# Patient Record
Sex: FEMALE | Race: WHITE | NOT HISPANIC OR LATINO | Employment: STUDENT | URBAN - METROPOLITAN AREA
[De-identification: names, ages, dates, MRNs, and addresses within clinical notes are randomized per-mention and may not be internally consistent; named-entity substitution may affect disease eponyms.]

---
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
## 2024-01-08 ENCOUNTER — PATIENT MESSAGE (OUTPATIENT)
Dept: PSYCHIATRY | Facility: CLINIC | Age: 19
End: 2024-01-08
Payer: COMMERCIAL

## 2024-01-10 DIAGNOSIS — F98.8 ATTENTION DEFICIT DISORDER (ADD) WITHOUT HYPERACTIVITY: ICD-10-CM

## 2024-01-10 NOTE — TELEPHONE ENCOUNTER
No care due was identified.  [facility] Embedded Care Due Messages. Reference number: [id number].   1/10/2024 10:08:52 AM CST

## 2024-01-11 ENCOUNTER — CLINICAL SUPPORT (OUTPATIENT)
Dept: PSYCHIATRY | Facility: CLINIC | Age: 19
End: 2024-01-11
Payer: COMMERCIAL

## 2024-01-11 DIAGNOSIS — F41.9 ANXIETY: Primary | ICD-10-CM

## 2024-01-11 DIAGNOSIS — F98.8 ATTENTION DEFICIT DISORDER (ADD) WITHOUT HYPERACTIVITY: ICD-10-CM

## 2024-01-11 PROCEDURE — 90837 PSYTX W PT 60 MINUTES: CPT | Mod: S$GLB,,, | Performed by: COUNSELOR

## 2024-01-11 PROCEDURE — 99999 PR PBB SHADOW E&M-EST. PATIENT-LVL I: CPT | Mod: PBBFAC,,, | Performed by: COUNSELOR

## 2024-01-11 RX ORDER — DEXTROAMPHETAMINE SACCHARATE, AMPHETAMINE ASPARTATE, DEXTROAMPHETAMINE SULFATE AND AMPHETAMINE SULFATE 7.5; 7.5; 7.5; 7.5 MG/1; MG/1; MG/1; MG/1
1 TABLET ORAL 2 TIMES DAILY
Qty: 60 TABLET | Refills: 0 | Status: SHIPPED | OUTPATIENT
Start: 2024-01-11 | End: 2024-01-22
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
## 2024-01-16 ENCOUNTER — CLINICAL SUPPORT (OUTPATIENT)
Dept: PSYCHIATRY | Facility: CLINIC | Age: 19
End: 2024-01-16
Payer: COMMERCIAL

## 2024-01-16 DIAGNOSIS — F41.9 ANXIETY: Primary | ICD-10-CM

## 2024-01-16 DIAGNOSIS — F98.8 ATTENTION DEFICIT DISORDER (ADD) WITHOUT HYPERACTIVITY: ICD-10-CM

## 2024-01-16 PROCEDURE — 90837 PSYTX W PT 60 MINUTES: CPT | Mod: 95,,, | Performed by: COUNSELOR
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
## 2024-01-19 ENCOUNTER — PATIENT MESSAGE (OUTPATIENT)
Dept: FAMILY MEDICINE | Facility: CLINIC | Age: 19
End: 2024-01-19
Payer: COMMERCIAL

## 2024-01-19 DIAGNOSIS — F98.8 ATTENTION DEFICIT DISORDER (ADD) WITHOUT HYPERACTIVITY: ICD-10-CM

## 2024-01-22 ENCOUNTER — OFFICE VISIT (OUTPATIENT)
Dept: PSYCHIATRY | Facility: CLINIC | Age: 19
End: 2024-01-22
Payer: COMMERCIAL

## 2024-01-22 VITALS
SYSTOLIC BLOOD PRESSURE: 110 MMHG | BODY MASS INDEX: 18.62 KG/M2 | DIASTOLIC BLOOD PRESSURE: 74 MMHG | HEIGHT: 65 IN | HEART RATE: 76 BPM | WEIGHT: 111.75 LBS

## 2024-01-22 DIAGNOSIS — F41.9 ANXIETY: Primary | ICD-10-CM

## 2024-01-22 DIAGNOSIS — F33.0 MDD (MAJOR DEPRESSIVE DISORDER), RECURRENT EPISODE, MILD: ICD-10-CM

## 2024-01-22 DIAGNOSIS — F41.1 GAD (GENERALIZED ANXIETY DISORDER): ICD-10-CM

## 2024-01-22 PROCEDURE — 1160F RVW MEDS BY RX/DR IN RCRD: CPT | Mod: CPTII,S$GLB,,

## 2024-01-22 PROCEDURE — 3078F DIAST BP <80 MM HG: CPT | Mod: CPTII,S$GLB,,

## 2024-01-22 PROCEDURE — 1159F MED LIST DOCD IN RCRD: CPT | Mod: CPTII,S$GLB,,

## 2024-01-22 PROCEDURE — 3074F SYST BP LT 130 MM HG: CPT | Mod: CPTII,S$GLB,,

## 2024-01-22 PROCEDURE — 99999 PR PBB SHADOW E&M-EST. PATIENT-LVL IV: CPT | Mod: PBBFAC,,,

## 2024-01-22 PROCEDURE — 90792 PSYCH DIAG EVAL W/MED SRVCS: CPT | Mod: S$GLB,,,

## 2024-01-22 RX ORDER — PAROXETINE 10 MG/1
TABLET, FILM COATED ORAL
Qty: 30 TABLET | Refills: 0 | Status: SHIPPED | OUTPATIENT
Start: 2024-01-22 | End: 2024-02-14

## 2024-01-22 RX ORDER — LEVOCETIRIZINE DIHYDROCHLORIDE 5 MG/1
5 TABLET, FILM COATED ORAL NIGHTLY
COMMUNITY

## 2024-01-22 RX ORDER — HYDROXYZINE HYDROCHLORIDE 50 MG/1
50 TABLET, FILM COATED ORAL 3 TIMES DAILY PRN
Qty: 90 TABLET | Refills: 1 | Status: SHIPPED | OUTPATIENT
Start: 2024-01-22 | End: 2024-04-16
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
## 2024-01-23 RX ORDER — DEXTROAMPHETAMINE SACCHARATE, AMPHETAMINE ASPARTATE, DEXTROAMPHETAMINE SULFATE AND AMPHETAMINE SULFATE 7.5; 7.5; 7.5; 7.5 MG/1; MG/1; MG/1; MG/1
1 TABLET ORAL 2 TIMES DAILY
Qty: 60 TABLET | Refills: 0 | Status: SHIPPED | OUTPATIENT
Start: 2024-01-23 | End: 2024-02-26

## 2024-01-23 RX ORDER — DEXTROAMPHETAMINE SACCHARATE, AMPHETAMINE ASPARTATE, DEXTROAMPHETAMINE SULFATE AND AMPHETAMINE SULFATE 7.5; 7.5; 7.5; 7.5 MG/1; MG/1; MG/1; MG/1
1 TABLET ORAL 2 TIMES DAILY
Qty: 60 TABLET | Refills: 0 | Status: SHIPPED | OUTPATIENT
Start: 2024-01-23 | End: 2024-01-23

## 2024-02-06 ENCOUNTER — PATIENT MESSAGE (OUTPATIENT)
Dept: PSYCHIATRY | Facility: CLINIC | Age: 19
End: 2024-02-06
Payer: COMMERCIAL

## 2024-02-14 DIAGNOSIS — F41.1 GAD (GENERALIZED ANXIETY DISORDER): ICD-10-CM

## 2024-02-14 DIAGNOSIS — F33.0 MDD (MAJOR DEPRESSIVE DISORDER), RECURRENT EPISODE, MILD: ICD-10-CM

## 2024-02-14 RX ORDER — PAROXETINE 10 MG/1
10 TABLET, FILM COATED ORAL NIGHTLY
Qty: 30 TABLET | Refills: 0 | Status: SHIPPED | OUTPATIENT
Start: 2024-02-14 | End: 2024-03-12

## 2024-02-16 ENCOUNTER — OFFICE VISIT (OUTPATIENT)
Dept: URGENT CARE | Facility: CLINIC | Age: 19
End: 2024-02-16
Payer: COMMERCIAL

## 2024-02-16 VITALS
HEIGHT: 65 IN | HEART RATE: 68 BPM | BODY MASS INDEX: 18.49 KG/M2 | RESPIRATION RATE: 16 BRPM | SYSTOLIC BLOOD PRESSURE: 115 MMHG | TEMPERATURE: 98 F | WEIGHT: 111 LBS | OXYGEN SATURATION: 98 % | DIASTOLIC BLOOD PRESSURE: 75 MMHG

## 2024-02-16 DIAGNOSIS — T14.8XXA SKIN ABRASION: ICD-10-CM

## 2024-02-16 DIAGNOSIS — J06.9 VIRAL URI WITH COUGH: Primary | ICD-10-CM

## 2024-02-16 DIAGNOSIS — R09.81 SINUS CONGESTION: ICD-10-CM

## 2024-02-16 LAB
CTP QC/QA: YES
CTP QC/QA: YES
POC MOLECULAR INFLUENZA A AGN: NEGATIVE
POC MOLECULAR INFLUENZA B AGN: NEGATIVE
SARS-COV-2 AG RESP QL IA.RAPID: NEGATIVE

## 2024-02-16 PROCEDURE — 87502 INFLUENZA DNA AMP PROBE: CPT | Mod: QW,S$GLB,, | Performed by: NURSE PRACTITIONER

## 2024-02-16 PROCEDURE — 87811 SARS-COV-2 COVID19 W/OPTIC: CPT | Mod: QW,S$GLB,, | Performed by: NURSE PRACTITIONER

## 2024-02-16 PROCEDURE — 99213 OFFICE O/P EST LOW 20 MIN: CPT | Mod: S$GLB,,, | Performed by: NURSE PRACTITIONER

## 2024-02-16 RX ORDER — IPRATROPIUM BROMIDE 21 UG/1
1 SPRAY, METERED NASAL 2 TIMES DAILY
Qty: 30 ML | Refills: 0 | Status: SHIPPED | OUTPATIENT
Start: 2024-02-16 | End: 2024-02-23

## 2024-02-16 RX ORDER — MUPIROCIN 20 MG/G
OINTMENT TOPICAL
Qty: 22 G | Refills: 0 | Status: SHIPPED | OUTPATIENT
Start: 2024-02-16 | End: 2024-05-03

## 2024-02-16 NOTE — PATIENT INSTRUCTIONS
You may continue to use Mucinex or other over-the-counter products for your symptoms as needed.    Please dress your wound with the prescribed antibiotic ointment and bandage for the next 2-3 days.  After that time the lesions should begin to scab and it would be best to leave it open to air.  If there are any changes to the lesion, including fever, spreading redness or pus, please return to this location for re-evaluation.

## 2024-02-16 NOTE — PROGRESS NOTES
"Subjective:      Patient ID: Tamanna Waters is a 18 y.o. female.    Vitals:  height is 5' 5" (1.651 m) and weight is 50.3 kg (111 lb). Her tympanic temperature is 97.5 °F (36.4 °C). Her blood pressure is 115/75 and her pulse is 68. Her respiration is 16 and oxygen saturation is 98%.     Chief Complaint: Sinus Problem and Laceration    Patient presents to clinic with complaint of cough, congestion, sore throat, loss of taste, and razor cut to the right leg. Symptoms started a week ago, but the cut happened last night while shaving.     Provider note begins below:    Patient comes to the clinic with a one-week history of sinus congestion, sore throat and cough.  She was recently in [place] but thinks she may have been ill prior to arriving.  Mother is also ill and tested positive for COVID-19.  Given mother's positive COVID-19 diagnosis after her daughter it is likely that the patient also had COVID-19 but is no longer testing positive.    Patient also endorses having shave last night and took off a superficial layer of epidermis.  Appears is 2 cm x 3 cm avulsed abrasion to anterior right lower leg.    Sinus Problem  This is a new problem. The current episode started 1 to 4 weeks ago. The problem is unchanged. There has been no fever. Her pain is at a severity of 2/10. Associated symptoms include congestion, coughing and a sore throat. Pertinent negatives include no chills, diaphoresis, ear pain, headaches, hoarse voice, neck pain, shortness of breath, sinus pressure, sneezing or swollen glands. Past treatments include oral decongestants.   Laceration       Constitution: Negative for chills and sweating.   HENT:  Positive for congestion and sore throat. Negative for ear pain and sinus pressure.    Neck: Negative for neck pain.   Respiratory:  Positive for cough. Negative for shortness of breath.    Skin:  Positive for laceration.   Allergic/Immunologic: Negative for sneezing.   Neurological:  Negative for " headaches.      Objective:     Physical Exam   Constitutional: She is oriented to person, place, and time. She appears well-developed. She is cooperative.  Non-toxic appearance. She does not appear ill. No distress.   HENT:   Head: Normocephalic and atraumatic.   Ears:   Right Ear: Hearing and external ear normal.   Left Ear: Hearing and external ear normal.   Nose: Rhinorrhea present. No mucosal edema or nasal deformity.   Mouth/Throat: Mucous membranes are normal.   Eyes: Conjunctivae and lids are normal. No scleral icterus.   Neck: Trachea normal and phonation normal. Neck supple. No edema present. No erythema present. No neck rigidity present.   Cardiovascular: Normal rate, regular rhythm, normal heart sounds and normal pulses.   Pulmonary/Chest: Effort normal and breath sounds normal. No respiratory distress. She has no decreased breath sounds. She has no rhonchi.   Abdominal: Normal appearance.   Musculoskeletal: Normal range of motion.         General: No deformity. Normal range of motion.   Neurological: She is alert and oriented to person, place, and time. She exhibits normal muscle tone. Coordination normal.   Skin: Skin is warm, dry, intact, not diaphoretic and not pale. Abrasions - lower ext.:  lower leg (right)       Psychiatric: Her speech is normal and behavior is normal. Judgment and thought content normal.   Nursing note and vitals reviewed.      Assessment:     1. Viral URI with cough    2. Sinus congestion    3. Skin abrasion        Plan:   Band-Aid with mupirocin applied to right lower leg abrasion.    I explained the quarantine process per CDC guidelines and patient acknowledged complete understanding and agrees to plan.      Viral URI with cough  -     ipratropium (ATROVENT) 21 mcg (0.03 %) nasal spray; 1 spray by Each Nostril route 2 (two) times daily. for 7 days  Dispense: 30 mL; Refill: 0    Sinus congestion  -     POCT Influenza A/B MOLECULAR  -     SARS Coronavirus 2 Antigen, POCT Manual  Read  -     ipratropium (ATROVENT) 21 mcg (0.03 %) nasal spray; 1 spray by Each Nostril route 2 (two) times daily. for 7 days  Dispense: 30 mL; Refill: 0    Skin abrasion  -     mupirocin (BACTROBAN) 2 % ointment; Apply to affected area 2 times daily  Dispense: 22 g; Refill: 0

## 2024-02-18 ENCOUNTER — PATIENT MESSAGE (OUTPATIENT)
Dept: FAMILY MEDICINE | Facility: CLINIC | Age: 19
End: 2024-02-18
Payer: COMMERCIAL

## 2024-02-20 ENCOUNTER — PATIENT MESSAGE (OUTPATIENT)
Dept: PSYCHIATRY | Facility: CLINIC | Age: 19
End: 2024-02-20
Payer: COMMERCIAL

## 2024-02-26 ENCOUNTER — OFFICE VISIT (OUTPATIENT)
Dept: FAMILY MEDICINE | Facility: CLINIC | Age: 19
End: 2024-02-26
Payer: COMMERCIAL

## 2024-02-26 VITALS
WEIGHT: 111.75 LBS | SYSTOLIC BLOOD PRESSURE: 120 MMHG | BODY MASS INDEX: 18.6 KG/M2 | HEART RATE: 98 BPM | OXYGEN SATURATION: 98 % | DIASTOLIC BLOOD PRESSURE: 70 MMHG

## 2024-02-26 DIAGNOSIS — R04.0 BLEEDING NOSE: Primary | ICD-10-CM

## 2024-02-26 DIAGNOSIS — J30.9 ALLERGIC RHINITIS, UNSPECIFIED SEASONALITY, UNSPECIFIED TRIGGER: ICD-10-CM

## 2024-02-26 PROCEDURE — 3074F SYST BP LT 130 MM HG: CPT | Mod: CPTII,S$GLB,,

## 2024-02-26 PROCEDURE — 99999 PR PBB SHADOW E&M-EST. PATIENT-LVL IV: CPT | Mod: PBBFAC,,,

## 2024-02-26 PROCEDURE — 3008F BODY MASS INDEX DOCD: CPT | Mod: CPTII,S$GLB,,

## 2024-02-26 PROCEDURE — 99213 OFFICE O/P EST LOW 20 MIN: CPT | Mod: S$GLB,,,

## 2024-02-26 PROCEDURE — 1159F MED LIST DOCD IN RCRD: CPT | Mod: CPTII,S$GLB,,

## 2024-02-26 PROCEDURE — 3078F DIAST BP <80 MM HG: CPT | Mod: CPTII,S$GLB,,

## 2024-02-26 RX ORDER — OXYMETAZOLINE HCL 0.05 %
2 SPRAY, NON-AEROSOL (ML) NASAL 2 TIMES DAILY
Qty: 22 ML | Refills: 0 | Status: SHIPPED | OUTPATIENT
Start: 2024-02-26 | End: 2024-02-29

## 2024-02-26 NOTE — PROGRESS NOTES
Name: Tamanna Waters  MRN: [id number]  : 2005  PCP: Richi Little MD    HPI    Patient follow with Dr. Little, presents for frequent nose bleeds. She reports nose bleeds have been an issue for several years. She reports she gets nose bleeds at least once a day most days. Nose bleeds are worst when she was rhinorrhea or congestion. She reports using tissue to pack her nose and wait for bleeding to stop which is typically 20 mins. She reports her father having to cautery done to stop his nose bleeds.     Review of Systems   HENT:  Positive for congestion, nosebleeds and sinus pain. Negative for sore throat.        Patient Active Problem List   Diagnosis    Impetigo    Episodic lightheadedness    Allergic rhinitis    Iron deficiency anemia due to chronic blood loss    Anxiety    Attention deficit disorder (ADD) without hyperactivity    Wears contact lenses    LINO (generalized anxiety disorder)    MDD (major depressive disorder), recurrent episode, mild       Vitals:    24 1333   BP: 120/70   Pulse: 98       Physical Exam  Constitutional:       General: She is not in acute distress.     Appearance: She is well-developed.   HENT:      Head: Normocephalic and atraumatic.      Right Ear: External ear normal.      Left Ear: External ear normal.   Eyes:      Conjunctiva/sclera: Conjunctivae normal.      Pupils: Pupils are equal, round, and reactive to light.   Neck:      Thyroid: No thyromegaly.   Cardiovascular:      Rate and Rhythm: Normal rate and regular rhythm.      Pulses: Normal pulses.      Heart sounds: Normal heart sounds, S1 normal and S2 normal.   Pulmonary:      Effort: Pulmonary effort is normal. No respiratory distress.      Breath sounds: Normal breath sounds.   Chest:      Chest wall: No tenderness.   Musculoskeletal:         General: No swelling or tenderness. Normal range of motion.      Cervical back: Normal range of motion and neck supple.   Skin:     General: Skin is warm and dry.       Coloration: Skin is not jaundiced or pale.   Neurological:      General: No focal deficit present.      Mental Status: She is alert and oriented to person, place, and time.      Cranial Nerves: No cranial nerve deficit.   Psychiatric:         Mood and Affect: Mood normal.         Behavior: Behavior normal.         1. Bleeding nose  -     Ambulatory referral/consult to ENT; Future; Expected date: 03/04/2024  -     oxymetazoline (AFRIN) 0.05 % nasal spray; 2 sprays by Nasal route 2 (two) times daily. for 3 days  Dispense: 22 mL; Refill: 0    2. Allergic rhinitis, unspecified seasonality, unspecified trigger        Follow up as needed or if symptoms fail to improve      TONE Weston  02/26/2024

## 2024-02-27 ENCOUNTER — CLINICAL SUPPORT (OUTPATIENT)
Dept: PSYCHIATRY | Facility: CLINIC | Age: 19
End: 2024-02-27
Payer: COMMERCIAL

## 2024-02-27 ENCOUNTER — PATIENT MESSAGE (OUTPATIENT)
Dept: PSYCHIATRY | Facility: CLINIC | Age: 19
End: 2024-02-27
Payer: COMMERCIAL

## 2024-02-27 DIAGNOSIS — F41.9 ANXIETY: Primary | ICD-10-CM

## 2024-02-27 DIAGNOSIS — F33.0 MDD (MAJOR DEPRESSIVE DISORDER), RECURRENT EPISODE, MILD: ICD-10-CM

## 2024-02-27 PROCEDURE — 90832 PSYTX W PT 30 MINUTES: CPT | Mod: 95,,, | Performed by: COUNSELOR

## 2024-02-27 NOTE — PROGRESS NOTES
Individual Psychotherapy (PhD/LCSW)    2/27/2024    Site:  [place]         The patient location [place]/City is:  [place]  The patient phone number is: [phone]   Visit type: Virtual visit with synchronous audio and video  Each patient to whom he or she provides medical services by telemedicine is:  (1) informed of the relationship between the physician and patient and the respective role of any other health care provider with respect to management of the patient; and (2) notified that he or she may decline to receive medical services by telemedicine and may withdraw from such care at any time.  Crisis Disclaimer: Patient was informed that due to the virtual nature of the visit, that if a crisis develops, protocols will be implemented to ensure patient safety, including but not limited to: 1) Initiating a welfare check with local Law Enforcement, 2) Calling 911/National Crisis Hotline, and/or 3) Initiating PEC/CEC procedures.    Therapeutic Intervention: Met with patient.  Outpatient - Insight oriented psychotherapy 30 min - CPT code 75741, Outpatient - Behavior modifying psychotherapy 30 min - CPT code 15819, and Outpatient - Supportive psychotherapy 30 min - CPT Code 98217    Chief complaint/reason for encounter: depression and anxiety             Interval history and content of current session: Patient presented for virtual session.  Patient denied SI/HI and self-harm.  She reported that sleep and appetite are good.  Patient reported being happy and peaceful now that she is growing closer to her family.  She reportedly has learned that the majority of the family conflict was due to her own anger and resentments.  She has changed jobs and feels good about that.  Her personal relationship is supportive and loving.  Patient stated that she has found it easy to be genuinely who she is without wearing mask and shutting out her emotions.  No medication or medical concerns currently.  She had COVID and a nose  bleed , but went to  her PCP to address. Will schedule as needed.      Treatment plan:  Target symptoms: depression, anxiety   Why chosen therapy is appropriate versus another modality: relevant to diagnosis, patient responds to this modality, evidence based practice  Outcome monitoring methods: self-report, observation  Therapeutic intervention type: insight oriented psychotherapy, behavior modifying psychotherapy, supportive psychotherapy    Risk parameters:  Patient reports no suicidal ideation  Patient reports no homicidal ideation  Patient reports no self-injurious behavior  Patient reports no violent behavior    Verbal deficits: None    Patient's response to intervention:  The patient's response to intervention is accepting.    Progress toward goals and other mental status changes:  The patient's progress toward goals is good.    Diagnosis:     ICD-10-CM ICD-9-CM   1. Anxiety  F41.9 300.00   2. MDD (major depressive disorder), recurrent episode, mild  F33.0 296.31       Plan:  individual psychotherapy Pt to go to ED or call 911 if symptoms worsen or if she has thoughts of harming self and/or others. Pt verbalized understanding.    Return to clinic: as scheduled    Length of Service (minutes): 45      Each patient to whom he or she provides medical services by telemedicine is: (1) informed of the relationship between the physician and patient and the respective role of any other health care provider with respect to management of the patient; and (2) notified that he or she may decline to receive medical services by telemedicine and may withdraw from such care at any time.

## 2024-02-29 ENCOUNTER — OFFICE VISIT (OUTPATIENT)
Dept: URGENT CARE | Facility: CLINIC | Age: 19
End: 2024-02-29
Payer: COMMERCIAL

## 2024-02-29 VITALS
SYSTOLIC BLOOD PRESSURE: 106 MMHG | TEMPERATURE: 98 F | DIASTOLIC BLOOD PRESSURE: 73 MMHG | RESPIRATION RATE: 16 BRPM | OXYGEN SATURATION: 98 % | HEART RATE: 77 BPM

## 2024-02-29 DIAGNOSIS — J06.9 VIRAL URI WITH COUGH: Primary | ICD-10-CM

## 2024-02-29 DIAGNOSIS — R05.9 COUGH, UNSPECIFIED TYPE: ICD-10-CM

## 2024-02-29 PROCEDURE — 87502 INFLUENZA DNA AMP PROBE: CPT | Mod: QW,S$GLB,, | Performed by: NURSE PRACTITIONER

## 2024-02-29 PROCEDURE — 87811 SARS-COV-2 COVID19 W/OPTIC: CPT | Mod: QW,S$GLB,, | Performed by: NURSE PRACTITIONER

## 2024-02-29 PROCEDURE — 99213 OFFICE O/P EST LOW 20 MIN: CPT | Mod: S$GLB,,, | Performed by: NURSE PRACTITIONER

## 2024-02-29 NOTE — PROGRESS NOTES
Subjective:      Patient ID: Tamanna Waters is a 18 y.o. female.    Vitals:  temperature is 98 °F (36.7 °C). Her blood pressure is 106/73 and her pulse is 77. Her respiration is 16 and oxygen saturation is 98%.     Chief Complaint: Cough    Pt presents with cough, fatigue, sinus ness, headache x 3 days    Pt was sick with same symptoms approx 3 weeks ago--neg for anything.  Got well now sick again.  Pt requests flu and covid    Cough  This is a new problem. The current episode started in the past 7 days. The problem has been gradually worsening. The problem occurs every few minutes. The cough is Non-productive. Associated symptoms include headaches, myalgias, nasal congestion and postnasal drip. Pertinent negatives include no chest pain, chills, ear pain, fever, rash or sore throat. Nothing aggravates the symptoms. Treatments tried: nasal spray. The treatment provided mild relief.       Constitution: Negative. Negative for chills, sweating, fatigue and fever.   HENT:  Positive for postnasal drip. Negative for ear pain, facial swelling, congestion and sore throat.    Neck: Negative for painful lymph nodes.   Cardiovascular: Negative.  Negative for chest trauma, chest pain and sob on exertion.   Eyes: Negative.  Negative for eye itching and eye pain.   Respiratory:  Positive for cough. Negative for chest tightness and asthma.    Gastrointestinal: Negative.  Negative for nausea, vomiting and diarrhea.   Endocrine: negative. cold intolerance and excessive thirst.   Genitourinary: Negative.  Negative for dysuria, frequency, urgency and hematuria.   Musculoskeletal:  Positive for muscle ache. Negative for pain, trauma and joint pain.   Skin: Negative.  Negative for rash, wound and hives.   Allergic/Immunologic: Negative.  Negative for eczema, asthma, hives and itching.   Neurological:  Positive for headaches. Negative for disorientation and altered mental status.   Hematologic/Lymphatic: Negative.  Negative for  swollen lymph nodes.   Psychiatric/Behavioral: Negative.  Negative for altered mental status, disorientation and confusion.       Objective:     Physical Exam   Constitutional: She is oriented to person, place, and time. She appears well-developed. She is cooperative.  Non-toxic appearance. She does not appear ill. No distress.   HENT:   Head: Normocephalic and atraumatic.   Ears:   Right Ear: Hearing, tympanic membrane, external ear and ear canal normal. impacted cerumen  Left Ear: Hearing, external ear and ear canal normal. A middle ear effusion is present. impacted cerumen  Nose: Mucosal edema and congestion present. No rhinorrhea or nasal deformity. No epistaxis. Right sinus exhibits no maxillary sinus tenderness and no frontal sinus tenderness. Left sinus exhibits no maxillary sinus tenderness and no frontal sinus tenderness.   Mouth/Throat: Uvula is midline and mucous membranes are normal. No trismus in the jaw. Normal dentition. No uvula swelling. No oropharyngeal exudate, posterior oropharyngeal edema or posterior oropharyngeal erythema.   Eyes: Conjunctivae and lids are normal. No scleral icterus.   Neck: Trachea normal and phonation normal. Neck supple. No edema present. No erythema present. No neck rigidity present.   Cardiovascular: Normal rate, regular rhythm, normal heart sounds and normal pulses.   Pulmonary/Chest: Effort normal and breath sounds normal. No stridor. No respiratory distress. She has no decreased breath sounds. She has no wheezes. She has no rhonchi. She has no rales. She exhibits no tenderness.   Abdominal: Normal appearance. There is no abdominal tenderness.   Musculoskeletal: Normal range of motion.         General: No deformity. Normal range of motion.   Lymphadenopathy:     She has no cervical adenopathy.   Neurological: no focal deficit. She is alert, oriented to person, place, and time and at baseline. She exhibits normal muscle tone. Coordination normal.   Skin: Skin is warm,  dry, intact, not diaphoretic and not pale.   Psychiatric: Her speech is normal and behavior is normal. Mood, judgment and thought content normal.   Nursing note and vitals reviewed.    Results for orders placed or performed in visit on 02/29/24   SARS Coronavirus 2 Antigen, POCT Manual Read   Result Value Ref Range    SARS Coronavirus 2 Antigen Negative Negative     Acceptable Yes    POCT Influenza A/B MOLECULAR   Result Value Ref Range    POC Molecular Influenza A Ag Negative Negative, Not Reported    POC Molecular Influenza B Ag Negative Negative, Not Reported     Acceptable Yes         Assessment:     1. Viral URI with cough    2. Cough, unspecified type        Plan:   FOLLOWUP  Follow up if symptoms worsen or fail to improve, for PLEASE CONTACT PCP OR CONTACT THE EMERGENCY ROOM..     PATIENT INSTRUCTIONS  Patient Instructions   INSTRUCTIONS:  - Rest.  - Drink plenty of fluids.  - Take Tylenol and/or Ibuprofen as directed as needed for fever/pain.  Do not take more than the recommended dose.  - follow up with your PCP within the next 1-2 weeks as needed.  - You must understand that you have received an Urgent Care treatment only and that you may be released before all of your medical problems are known or treated.   - You, the patient, will arrange for follow up care as instructed.   - If your condition worsens or fails to improve we recommend that you receive another evaluation at the ER immediately or contact your PCP to discuss your concerns.   - You can call [phone] or [phone] to help schedule an appointment with the appropriate provider.     -If you smoke cigarettes, it would be beneficial for you to stop.    OVER THE COUNTER RECOMMENDATIONS/SUGGESTIONS.     Make sure to stay well hydrated.     Use Nasal Saline to mechanically move any post nasal drip from your eustachian tube or from the back of your throat.     Use warm salt water gargles to ease your throat  pain. Warm salt water gargles as needed for sore throat-  1/2 tsp salt to 1 cup warm water, gargle as desired.     Use an antihistamine such as Claritin, Zyrtec or Allegra to dry you out.      Use pseudoephedrine (behind the counter) to decongest. Pseudoephedrine  30 mg up to 240 mg /day. It can raise your blood pressure and give you palpitations.     Use mucinex (guaifenisin) to break up mucous up to 2400mg/day to loosen any mucous.   The mucinex DM pill has a cough suppressant that can be sedating. It can be used at night to stop the tickle at the back of your throat.  You can use Mucinex D (it has guaifenesin and a high dose of pseudoephedrine) in the mornings to help decongest.        Use Flonase 1-2 sprays/nostril per day. It is a local acting steroid nasal spray, if you develop a bloody nose, stop using the medication immediately.     Sometimes Nyquil at night is beneficial to help you get some rest, however it is sedating and it does have an antihistamine, and tylenol.     Honey is a natural cough suppressant that can be used.     Tylenol up to 4,000 mg a day is safe for short periods and can be used for body aches, pain, and fever. However in high doses and prolonged use it can cause liver irritation.     Ibuprofen is a non-steroidal anti-inflammatory that can be used for body aches, pain, and fever.However it can also cause stomach irritation if over used.         THANK YOU FOR ALLOWING ME TO PARTICIPATE IN YOUR HEALTHCARE,     Valeriano Galloway NP     Viral URI with cough    Cough, unspecified type  -     SARS Coronavirus 2 Antigen, POCT Manual Read  -     POCT Influenza A/B MOLECULAR

## 2024-02-29 NOTE — PATIENT INSTRUCTIONS
INSTRUCTIONS:  - Rest.  - Drink plenty of fluids.  - Take Tylenol and/or Ibuprofen as directed as needed for fever/pain.  Do not take more than the recommended dose.  - follow up with your PCP within the next 1-2 weeks as needed.  - You must understand that you have received an Urgent Care treatment only and that you may be released before all of your medical problems are known or treated.   - You, the patient, will arrange for follow up care as instructed.   - If your condition worsens or fails to improve we recommend that you receive another evaluation at the ER immediately or contact your PCP to discuss your concerns.   - You can call [phone] or [phone] to help schedule an appointment with the appropriate provider.     -If you smoke cigarettes, it would be beneficial for you to stop.    OVER THE COUNTER RECOMMENDATIONS/SUGGESTIONS.     Make sure to stay well hydrated.     Use Nasal Saline to mechanically move any post nasal drip from your eustachian tube or from the back of your throat.     Use warm salt water gargles to ease your throat pain. Warm salt water gargles as needed for sore throat-  1/2 tsp salt to 1 cup warm water, gargle as desired.     Use an antihistamine such as Claritin, Zyrtec or Allegra to dry you out.      Use pseudoephedrine (behind the counter) to decongest. Pseudoephedrine  30 mg up to 240 mg /day. It can raise your blood pressure and give you palpitations.     Use mucinex (guaifenisin) to break up mucous up to 2400mg/day to loosen any mucous.   The mucinex DM pill has a cough suppressant that can be sedating. It can be used at night to stop the tickle at the back of your throat.  You can use Mucinex D (it has guaifenesin and a high dose of pseudoephedrine) in the mornings to help decongest.        Use Flonase 1-2 sprays/nostril per day. It is a local acting steroid nasal spray, if you develop a bloody nose, stop using the medication immediately.     Sometimes Nyquil at night  is beneficial to help you get some rest, however it is sedating and it does have an antihistamine, and tylenol.     Honey is a natural cough suppressant that can be used.     Tylenol up to 4,000 mg a day is safe for short periods and can be used for body aches, pain, and fever. However in high doses and prolonged use it can cause liver irritation.     Ibuprofen is a non-steroidal anti-inflammatory that can be used for body aches, pain, and fever.However it can also cause stomach irritation if over used.

## 2024-03-04 ENCOUNTER — OFFICE VISIT (OUTPATIENT)
Dept: OTOLARYNGOLOGY | Facility: CLINIC | Age: 19
End: 2024-03-04
Payer: COMMERCIAL

## 2024-03-04 VITALS
DIASTOLIC BLOOD PRESSURE: 73 MMHG | HEART RATE: 96 BPM | BODY MASS INDEX: 19.08 KG/M2 | SYSTOLIC BLOOD PRESSURE: 108 MMHG | WEIGHT: 114.63 LBS

## 2024-03-04 DIAGNOSIS — J34.89 NASAL OBSTRUCTION WITHOUT CHOANAL ATRESIA: ICD-10-CM

## 2024-03-04 DIAGNOSIS — M95.0 INCOMPETENT NASAL VALVE: ICD-10-CM

## 2024-03-04 DIAGNOSIS — J34.2 DEVIATED NASAL SEPTUM: ICD-10-CM

## 2024-03-04 DIAGNOSIS — R04.0 EPISTAXIS: ICD-10-CM

## 2024-03-04 DIAGNOSIS — J30.89 PERENNIAL ALLERGIC RHINITIS: Primary | ICD-10-CM

## 2024-03-04 DIAGNOSIS — J34.3 HYPERTROPHY OF INFERIOR NASAL TURBINATE: ICD-10-CM

## 2024-03-04 PROCEDURE — 99999 PR PBB SHADOW E&M-EST. PATIENT-LVL IV: CPT | Mod: PBBFAC,,, | Performed by: PHYSICIAN ASSISTANT

## 2024-03-04 PROCEDURE — 31231 NASAL ENDOSCOPY DX: CPT | Mod: S$GLB,,, | Performed by: PHYSICIAN ASSISTANT

## 2024-03-04 PROCEDURE — 3008F BODY MASS INDEX DOCD: CPT | Mod: CPTII,S$GLB,, | Performed by: PHYSICIAN ASSISTANT

## 2024-03-04 PROCEDURE — 3078F DIAST BP <80 MM HG: CPT | Mod: CPTII,S$GLB,, | Performed by: PHYSICIAN ASSISTANT

## 2024-03-04 PROCEDURE — 99214 OFFICE O/P EST MOD 30 MIN: CPT | Mod: 25,S$GLB,, | Performed by: PHYSICIAN ASSISTANT

## 2024-03-04 PROCEDURE — 3074F SYST BP LT 130 MM HG: CPT | Mod: CPTII,S$GLB,, | Performed by: PHYSICIAN ASSISTANT

## 2024-03-04 PROCEDURE — 1159F MED LIST DOCD IN RCRD: CPT | Mod: CPTII,S$GLB,, | Performed by: PHYSICIAN ASSISTANT

## 2024-03-04 RX ORDER — TRIAMCINOLONE ACETONIDE 55 UG/1
2 SPRAY, METERED NASAL DAILY
Qty: 16.9 ML | Refills: 11 | Status: SHIPPED | OUTPATIENT
Start: 2024-03-04 | End: 2024-05-20

## 2024-03-04 NOTE — PROGRESS NOTES
Subjective:     Tamanna Waters is a 18 y.o. female who was referred to me by Rafael Blair in consultation for nosebleeds.    Patient reports recurrent episodes of nasal obstruction with associated rhinorrhea, increased mucous production, sneezing, watery/dry eyes and hyposmia.  Patient reports seasonal allergies which usually results in dry nose and epistaxis.  Epistaxis usually from L nostril.      Patient denies a history of asthma, sleep apnea, sinus surgery, or nasal trauma.  Patient reports she has never had any allergy testing.    Past Medical History  She has a past medical history of Iron deficiency anemia due to chronic blood loss and Jaundice.    Past Surgical History  She has no past surgical history on file.    Family History  Her family history includes Depression in her maternal aunt and maternal grandmother; Diabetes in her maternal grandfather, maternal grandmother, maternal uncle, and paternal grandfather; Glaucoma in her maternal grandmother; Heart disease in her maternal grandfather and maternal grandmother; Mental illness in her maternal grandmother; No Known Problems in her brother, father, mother, paternal aunt, paternal grandmother, paternal uncle, and sister; Other in her maternal grandmother.    Social History  She reports that she has never smoked. She has never used smokeless tobacco. She reports current alcohol use. She reports that she does not use drugs.    Allergies  She is allergic to doxycycline, phenergan [promethazine], wellbutrin [bupropion hcl], and zoloft [sertraline].    Medications  She has a current medication list which includes the following prescription(s): hydroxyzine, advil, levocetirizine, loratadine-pseudoephedrine  mg, mupirocin, and paroxetine, and the following Facility-Administered Medications: levonorgestrel.    Review of Systems     Constitutional: Negative for appetite change, chills, fatigue, fever and unexpected weight loss.      HENT: Positive  for facial swelling, nosebleeds, postnasal drip, sinus infection, sinus pressure and trouble swallowing.      Eyes:  Positive for eye drainage, eye itching and photophobia.     Respiratory:  Negative for cough, shortness of breath, sleep apnea, snoring and wheezing.      Cardiovascular:  Negative for chest pain, foot swelling, irregular heartbeat and swollen veins.     Gastrointestinal:  Positive for constipation and diarrhea.     Genitourinary: Negative for difficulty urinating, sexual problems and frequent urination.     Musc: Positive for aching muscles, back pain and neck pain.     Skin: Positive for rash.     Allergy: Positive for seasonal allergies.     Endocrine: Positive for cold intolerance.     Neurological: Positive for dizziness, light-headedness and seizures.     Hematologic: Positive for bruises/bleeds easily.     Psychiatric: Negative for decreased concentration, depression, nervous/anxious and sleep disturbance.               Objective:     /73 (BP Location: Left arm, Patient Position: Sitting, BP Method: Small (Automatic))   Pulse 96   Wt 52 kg (114 lb 10.2 oz)   LMP 02/19/2024 (Approximate)   BMI 19.08 kg/m²      Constitutional:   She appears well-developed and well-nourished. Normal speech.      Head:  Normocephalic and atraumatic. Facial strength is normal.      Ears:    Right Ear: No drainage or swelling. Tympanic membrane is not perforated and not erythematous. No middle ear effusion.   Left Ear: No drainage or swelling. Tympanic membrane is not perforated and not erythematous.  No middle ear effusion.     Nose:  Septal deviation present. No mucosal edema, rhinorrhea or polyps. Nasal septal hematoma: left. No foreign bodies. Turbinate hypertrophy.  Turbinates normal and no turbinate masses.  Right sinus exhibits maxillary sinus tenderness. Right sinus exhibits no frontal sinus tenderness. Left sinus exhibits maxillary sinus tenderness. Left sinus exhibits no frontal sinus tenderness.  "    Mouth/Throat  Oropharynx clear and moist without lesions or asymmetry, normal uvula midline and lips, teeth, and gums normal. No trismus or mucous membrane lesions. No oropharyngeal exudate, posterior oropharyngeal edema or posterior oropharyngeal erythema. Tonsils present.      Neck:  Phonation normal. No thyroid mass and no thyromegaly present.     She has no cervical adenopathy.     Pulmonary/Chest:   Effort normal.     Psychiatric:   She has a normal mood and affect. Her speech is normal and behavior is normal.       Procedure    Nasal endoscopy performed.  See procedure note.     R NV     R MT     L NV with DNS     L MT      Data Reviewed    Hemoglobin (g/dL)   Date Value   08/18/2023 12.2     Hematocrit (%)   Date Value   08/18/2023 37.0     Platelets (K/uL)   Date Value   08/18/2023 223     No results found for: "LABPROT"  No results found for: "APTT"  No results found for: "INR"    Assessment and Plan:     1. Perennial allergic rhinitis  2. Nasal obstruction without choanal atresia  3. Incompetent nasal valve  4. Deviated nasal septum  5. Hypertrophy of inferior nasal turbinate   - Suspect combo of above contributing to symptoms   - checking inhalant immunocaps   - Prescribed patient to START fluticasone propionate and also educated patient on how to properly use nasal sprays  6. Epistaxis  -     discussed intranasal hydration to treat epistaxis  - No evidence of recent bleeding but did sneeze during scope and mild L septal bleeding started but controlled    She will Follow up in about 3 weeks (around 3/25/2024) for test results, re-evaluate post treatment.   I had a discussion with the patient regarding her condition and the further workup and management options.    All questions were answered, and the patient is in agreement with the above.   "

## 2024-03-04 NOTE — PROCEDURES
"Nasal/sinus endoscopy    Date/Time: 3/4/2024 3:00 PM    Time out: Immediately prior to procedure a "time out" was called to verify the correct patient, procedure, equipment, support staff and site/side marked as required.    Performed by: Tres Lyman PA-C  Authorized by: Tres Lyman PA-C    Consent Done?:  Yes (Verbal)  Anesthesia:     Local anesthetic:  Phenylephrine spray    Location: Bilateral.    Endoscope type: Rigid.    Patient tolerance:  Patient tolerated the procedure well with no immediate complications  Nose:     Procedure Performed:  Nasal Endoscopy  External:      No external nasal deformity  Intranasal:      Mucosa no polyps     Mucosa ulcers not present     No mucosa lesions present     Enlarged turbinates     Septum gross deformity (leftward)  Nasopharynx:      No mucosa lesions     Adenoids present     Posterior choanae patent     Eustachian tube patent     Clear mucous from posterior ITs bilaterally without evidence of mucpurulent discharge or signfican tdrainage    "

## 2024-03-04 NOTE — PATIENT INSTRUCTIONS
Nasal Saline Spray ([place], [place], [employer]) - prior to nasal sprays  I want you to use a nasal saline spray (available over the counter) before applying your nasal spray medications.  This spray can help wash away mucus and crusting and allow for better absorption of the medications.  Please use the nasal saline spray about 15 minutes before applying your medicated nasal sprays.     Azelastine (Astepro, Astelin) if allergy testing positive  This is a nasal spray that primarily treats nasal drainage/runny nose (rhinorrhea) and nasal itching.    This works fairly quickly after onset and can be used as needed (does not have to be used as a scheduled medication).  Use as directed, up to 2 times daily.    Nasal Steroid Spray (Flonase, Nasaocort, Rhinocort, Sensimyst) - 2 sprays each nostril once daily  Generic names for over the counter sprays: fluticasone propionate, triamcinolone acetonide, budesonide  You have been prescribed or instructed to take a nasal steroid spray (Flonase). Some symptoms will experience relief within a few days; however, it may take 2-3 weeks to begin to see improvement. This medication needs to be taken consistently to see results.    Use as directed and please be aware the Flonase takes 7-10 days of consistent use before becoming effective- so try to be patient :)    Helpful hints for maximizing nasal spray medication into the nose  - Use the opposite hand to spray the nostril (example: right hand for left nostril). This will help avoid spraying the medication onto the septum (the area that divides the left and right nasal cavity.)  - Tilt the bottle so that it is facing at a slight angle up or straight back, but avoid pointing the bottle straight up while spraying.   - Gently sniff (do not snort) a few seconds after spraying.

## 2024-03-07 ENCOUNTER — PATIENT MESSAGE (OUTPATIENT)
Dept: PSYCHIATRY | Facility: CLINIC | Age: 19
End: 2024-03-07
Payer: COMMERCIAL

## 2024-03-11 ENCOUNTER — LAB VISIT (OUTPATIENT)
Dept: LAB | Facility: HOSPITAL | Age: 19
End: 2024-03-11
Payer: COMMERCIAL

## 2024-03-11 DIAGNOSIS — J30.89 PERENNIAL ALLERGIC RHINITIS: ICD-10-CM

## 2024-03-11 LAB — IGE SERPL-ACNC: <35 IU/ML (ref 0–100)

## 2024-03-11 PROCEDURE — 82785 ASSAY OF IGE: CPT | Performed by: PHYSICIAN ASSISTANT

## 2024-03-11 PROCEDURE — 36415 COLL VENOUS BLD VENIPUNCTURE: CPT | Mod: PO | Performed by: PHYSICIAN ASSISTANT

## 2024-03-11 PROCEDURE — 86003 ALLG SPEC IGE CRUDE XTRC EA: CPT | Performed by: PHYSICIAN ASSISTANT

## 2024-03-11 PROCEDURE — 86003 ALLG SPEC IGE CRUDE XTRC EA: CPT | Mod: 59 | Performed by: PHYSICIAN ASSISTANT

## 2024-03-12 DIAGNOSIS — F33.0 MDD (MAJOR DEPRESSIVE DISORDER), RECURRENT EPISODE, MILD: ICD-10-CM

## 2024-03-12 DIAGNOSIS — F41.1 GAD (GENERALIZED ANXIETY DISORDER): ICD-10-CM

## 2024-03-12 RX ORDER — PAROXETINE 10 MG/1
10 TABLET, FILM COATED ORAL NIGHTLY
Qty: 30 TABLET | Refills: 0 | Status: SHIPPED | OUTPATIENT
Start: 2024-03-12 | End: 2024-03-14 | Stop reason: DRUGHIGH
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
## 2024-03-13 ENCOUNTER — CLINICAL SUPPORT (OUTPATIENT)
Dept: PSYCHIATRY | Facility: CLINIC | Age: 19
End: 2024-03-13
Payer: COMMERCIAL

## 2024-03-13 DIAGNOSIS — F41.1 GAD (GENERALIZED ANXIETY DISORDER): ICD-10-CM

## 2024-03-13 DIAGNOSIS — F33.0 MDD (MAJOR DEPRESSIVE DISORDER), RECURRENT EPISODE, MILD: Primary | ICD-10-CM

## 2024-03-13 DIAGNOSIS — F98.8 ATTENTION DEFICIT DISORDER (ADD) WITHOUT HYPERACTIVITY: ICD-10-CM

## 2024-03-13 PROCEDURE — 90837 PSYTX W PT 60 MINUTES: CPT | Mod: 95,,, | Performed by: COUNSELOR
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
## 2024-03-14 ENCOUNTER — OFFICE VISIT (OUTPATIENT)
Dept: PSYCHIATRY | Facility: CLINIC | Age: 19
End: 2024-03-14
Payer: COMMERCIAL

## 2024-03-14 DIAGNOSIS — F33.0 MDD (MAJOR DEPRESSIVE DISORDER), RECURRENT EPISODE, MILD: ICD-10-CM

## 2024-03-14 DIAGNOSIS — F41.1 GAD (GENERALIZED ANXIETY DISORDER): Primary | ICD-10-CM

## 2024-03-14 LAB
A ALTERNATA IGE QN: <0.1 KU/L
A FUMIGATUS IGE QN: <0.1 KU/L
BERMUDA GRASS IGE QN: <0.1 KU/L
CAT DANDER IGE QN: <0.1 KU/L
CEDAR IGE QN: <0.1 KU/L
D FARINAE IGE QN: <0.1 KU/L
D PTERONYSS IGE QN: <0.1 KU/L
DEPRECATED A ALTERNATA IGE RAST QL: NORMAL
DEPRECATED A FUMIGATUS IGE RAST QL: NORMAL
DEPRECATED BERMUDA GRASS IGE RAST QL: NORMAL
DEPRECATED CAT DANDER IGE RAST QL: NORMAL
DEPRECATED CEDAR IGE RAST QL: NORMAL
DEPRECATED D FARINAE IGE RAST QL: NORMAL
DEPRECATED D PTERONYSS IGE RAST QL: NORMAL
DEPRECATED DOG DANDER IGE RAST QL: NORMAL
DEPRECATED ELDER IGE RAST QL: NORMAL
DEPRECATED ENGL PLANTAIN IGE RAST QL: NORMAL
DEPRECATED PECAN/HICK TREE IGE RAST QL: NORMAL
DEPRECATED ROACH IGE RAST QL: NORMAL
DEPRECATED TIMOTHY IGE RAST QL: NORMAL
DEPRECATED WEST RAGWEED IGE RAST QL: NORMAL
DEPRECATED WHITE OAK IGE RAST QL: NORMAL
DOG DANDER IGE QN: <0.1 KU/L
ELDER IGE QN: <0.1 KU/L
ENGL PLANTAIN IGE QN: <0.1 KU/L
PECAN/HICK TREE IGE QN: <0.1 KU/L
ROACH IGE QN: <0.1 KU/L
TIMOTHY IGE QN: <0.1 KU/L
WEST RAGWEED IGE QN: <0.1 KU/L
WHITE OAK IGE QN: <0.1 KU/L

## 2024-03-14 PROCEDURE — 1159F MED LIST DOCD IN RCRD: CPT | Mod: CPTII,95,,

## 2024-03-14 PROCEDURE — 1160F RVW MEDS BY RX/DR IN RCRD: CPT | Mod: CPTII,95,,

## 2024-03-14 PROCEDURE — 99214 OFFICE O/P EST MOD 30 MIN: CPT | Mod: 95,,,

## 2024-03-14 RX ORDER — PAROXETINE HYDROCHLORIDE 20 MG/1
20 TABLET, FILM COATED ORAL NIGHTLY
Qty: 30 TABLET | Refills: 1 | Status: SHIPPED | OUTPATIENT
Start: 2024-03-14 | End: 2024-04-16 | Stop reason: DRUGHIGH

## 2024-03-27 ENCOUNTER — TELEPHONE (OUTPATIENT)
Dept: OTOLARYNGOLOGY | Facility: CLINIC | Age: 19
End: 2024-03-27
Payer: COMMERCIAL

## 2024-03-27 ENCOUNTER — OFFICE VISIT (OUTPATIENT)
Dept: OTOLARYNGOLOGY | Facility: CLINIC | Age: 19
End: 2024-03-27
Payer: COMMERCIAL

## 2024-03-27 DIAGNOSIS — M95.0 INCOMPETENT NASAL VALVE: ICD-10-CM

## 2024-03-27 DIAGNOSIS — H10.13 ALLERGIC CONJUNCTIVITIS OF BOTH EYES: ICD-10-CM

## 2024-03-27 DIAGNOSIS — J31.0 NON-ALLERGIC RHINITIS: Primary | ICD-10-CM

## 2024-03-27 PROCEDURE — 99213 OFFICE O/P EST LOW 20 MIN: CPT | Mod: 95,,, | Performed by: PHYSICIAN ASSISTANT

## 2024-03-27 RX ORDER — AZELASTINE HYDROCHLORIDE 0.5 MG/ML
1 SOLUTION/ DROPS OPHTHALMIC 2 TIMES DAILY
Qty: 6 ML | Refills: 3 | Status: SHIPPED | OUTPATIENT
Start: 2024-03-27 | End: 2024-05-20

## 2024-03-27 NOTE — TELEPHONE ENCOUNTER
----- Message from Graciela Brown sent at 3/27/2024  9:38 AM CDT -----  Regarding: [facility]ST VIRTUAL APPOINTMENT  Contact: [phone]  Calling in regards to rescheduling appointment to virtual for TODAY as soon as possible. Please call and schedule.

## 2024-03-27 NOTE — PROGRESS NOTES
Otolaryngology Telemedicine Note    Tamanna Waters  Encounter Date: 3/27/2024   YOB: 2005  Referring Physician: No referring provider defined for this encounter.   PCP: Richi Little MD    This is a telehealth visit that was performed at [facility]. Verbal consent to participate in video visit was obtained.     Each patient to whom he or she provides medical services by telemedicine is:  (1) informed of the relationship between the provider and patient and the respective role of any other health care provider with respect to management of the patient; and (2) notified that he or she may decline to receive medical services by telemedicine and may withdraw from such care at any time.  I discussed with the patient that:  - I would evaluate the patient and recommend diagnostics and treatments based on my assessment  - Our sessions are not being recorded and that personal health information is protected  - Our team would provide follow up care in person if/when the patient needs it    The patient location is: [facility] in [facility].   The chief complaint leading to consultation is: rhinitis  Visit type: Virtual visit with synchronous audio and video  Total time spent with patient: 15    HPI: Tamanna is a 18 y.o. female who comes for follow-up of rhinitis.  Her last visit with me was on 3/4/2024.  ELVIN negative.    Patient reports overall improvement in symptoms.  Patient has been using Afrin as needed.  Patient has not started Flonase.  Patient does report a recent exposure to a friend's dog which caused her to have sweating with associated watery/itchy eyes rhinorrhea and fatigue.  Patient also states that taking Benadryl will help with her reaction.  Patient tries using an antihistamine pill daily without significant relief to allergy symptoms.  Patient currently denies any nasal obstruction or hyposmia.    Per last office visit 3/4/2024 :  Patient reports recurrent episodes of nasal obstruction  with associated rhinorrhea, increased mucous production, sneezing, watery/dry eyes and hyposmia.  Patient reports seasonal allergies which usually results in dry nose and epistaxis.  Epistaxis usually from L nostril.       Patient denies a history of asthma, sleep apnea, sinus surgery, or nasal trauma.  Patient reports she has never had any allergy testing.    1. Perennial allergic rhinitis  2. Nasal obstruction without choanal atresia  3. Incompetent nasal valve  4. Deviated nasal septum  5. Hypertrophy of inferior nasal turbinate              - Suspect combo of above contributing to symptoms              - checking inhalant immunocaps              - Prescribed patient to START fluticasone propionate and also educated patient on how to properly use nasal sprays  6. Epistaxis  -     discussed intranasal hydration to treat epistaxis  -     No evidence of recent bleeding but did sneeze during scope and mild L septal bleeding started but controlled      The patient's medications, allergies, past medical, surgical, social and family histories were reviewed and updated as appropriate.    A detailed review of systems was obtained with pertinent positives as per the above HPI, and otherwise negative.    Physical Exam:    Constitutional  General Appearance: well nourished, well-developed, alert, oriented, in no acute distress  Communication: ability, understanding, voice quality normal  Head and Face  Inspection: normocephalic, atraumatic, no scars, lesions or masses    Eyes  Ocular Motility / Alignment: normal alignment, motility, no proptosis  Conjunctiva: not injected  Eyelids: no entropion or ectropion, no edema    Nose  External Nose: no lesions, trauma or deformity  Oral Cavity / Oropharynx  Lips: upper and lower lips pink and moist  Tongue: normal mobility, no obvious lesions    Neck  Inspection: no erythema, no obvious large masses visible  Chest / Respiratory  Chest: no stridor or retractions, normal effort and  expansion  Neurological  Cranial Nerves: grossly intact  General: alert and oriented  Psychiatric  Mood and Affect: no depression, anxiety or agitation    I personally reviewed the following pertinent data at today's visit:    WBC (K/uL)   Date Value   08/18/2023 7.58     Eosinophil % (%)   Date Value   08/18/2023 1.5     Eos # (K/uL)   Date Value   08/18/2023 0.1     Platelets (K/uL)   Date Value   08/18/2023 223     Glucose (mg/dL)   Date Value   08/18/2023 85     Total IgE (IU/mL)   Date Value   03/11/2024 <35       Assessment & Plan:     1. Non-allergic rhinitis  2. Incompetent nasal valve   - STOP afrin and start flonase as discussed at last visit.    - suspect some sensitivities to certain odors or airborne irritants rather than AR.  Discussed this with patient.     3. Allergic conjunctivitis of both eyes  -     azelastine (OPTIVAR) 0.05 % ophthalmic solution; Place 1 drop into both eyes 2 (two) times daily.  Dispense: 6 mL; Refill: 3    She will follow up as needed  I had a discussion with the patient regarding her condition and the further workup and management options.    All questions were answered, and the patient is in agreement with the above.     Disclaimer:  This note may have been prepared utilizing voice recognition software which may result in occasional typographical errors in the text such as sound alike words.   If further clarification is needed, please contact the ENT department of [facility].

## 2024-04-12 ENCOUNTER — PATIENT MESSAGE (OUTPATIENT)
Dept: PSYCHIATRY | Facility: CLINIC | Age: 19
End: 2024-04-12
Payer: COMMERCIAL

## 2024-04-16 ENCOUNTER — OFFICE VISIT (OUTPATIENT)
Dept: PSYCHIATRY | Facility: CLINIC | Age: 19
End: 2024-04-16
Payer: COMMERCIAL

## 2024-04-16 ENCOUNTER — TELEPHONE (OUTPATIENT)
Dept: PSYCHIATRY | Facility: CLINIC | Age: 19
End: 2024-04-16
Payer: COMMERCIAL

## 2024-04-16 DIAGNOSIS — F33.0 MDD (MAJOR DEPRESSIVE DISORDER), RECURRENT EPISODE, MILD: ICD-10-CM

## 2024-04-16 DIAGNOSIS — F41.1 GAD (GENERALIZED ANXIETY DISORDER): Primary | ICD-10-CM

## 2024-04-16 PROCEDURE — 99214 OFFICE O/P EST MOD 30 MIN: CPT | Mod: 95,,,

## 2024-04-16 PROCEDURE — 1160F RVW MEDS BY RX/DR IN RCRD: CPT | Mod: CPTII,95,,

## 2024-04-16 PROCEDURE — 1159F MED LIST DOCD IN RCRD: CPT | Mod: CPTII,95,,

## 2024-04-16 RX ORDER — PAROXETINE HYDROCHLORIDE 40 MG/1
40 TABLET, FILM COATED ORAL EVERY MORNING
Qty: 30 TABLET | Refills: 1 | Status: SHIPPED | OUTPATIENT
Start: 2024-04-16 | End: 2024-04-16

## 2024-04-16 RX ORDER — PROPRANOLOL HYDROCHLORIDE 10 MG/1
10 TABLET ORAL 3 TIMES DAILY PRN
Qty: 90 TABLET | Refills: 1 | Status: SHIPPED | OUTPATIENT
Start: 2024-04-16 | End: 2024-05-20 | Stop reason: DRUGHIGH

## 2024-04-16 RX ORDER — PAROXETINE HYDROCHLORIDE 40 MG/1
40 TABLET, FILM COATED ORAL NIGHTLY
Qty: 30 TABLET | Refills: 1 | Status: SHIPPED | OUTPATIENT
Start: 2024-04-16 | End: 2024-05-20

## 2024-04-16 NOTE — PROGRESS NOTES
Outpatient Psychiatry Follow-Up Visit    Clinical Status of Patient: Outpatient (Ambulatory)  04/16/2024    The patient location is:  [place], [place]  The patient phone number is: [phone]   Visit type: Virtual visit with synchronous audio and video  Each patient to whom he or she provides medical services by telemedicine is:  (1) informed of the relationship between the physician and patient and the respective role of any other health care provider with respect to management of the patient; and (2) notified that he or she may decline to receive medical services by telemedicine and may withdraw from such care at any time.     Chief Complaint: Pt is a 18 y.o. female who presents today for a follow-up. Met with patient.       Interval History and Content of Current Session:  Interim Events/Subjective Report/Content of Current Session:  follow up appointment.    Pt is a 18 y.o. female with past psychiatric hx of LINO (generalized anxiety disorder), MDD (major depressive disorder), recurrent episode, mild who presents for follow up treatment.     Past Psychiatric hx:   Pt. is a 19 yo F with a past psychiatric hx of Anxiety and depression, ADHD presenting to the clinic for an initial evaluation and treatment. Past medical history outlined below. She is not currently taking any psychotropic medications.      3/14/24: Pt presents to OP Psychiatry for routine follow-up for treatment/medication management of LINO, MDD. Pt reports significant improvement in anxiety and depression, and is relieved that she saw this much progress, but still c/o unresolved symptoms. Discussed increasing Paxil to help resolve these symptoms, pt in agreement. States she took the Atarax only once and it was helpful. Feels that this plan is going good. Pt denies SI/HI/AVH, self-harm, plan, or intent. Pt verbalizes understanding of medication instructions through teach back. No other issues, concerns, or problems, will reassess symptoms and  medications in 30 days or PRN if symptoms worsen.     Past Medical hx:   Past Medical History:   Diagnosis Date    Acne 9 years old    Facial trauma Acne?? Very dry skin, dry eyes    Hearing loss My fault    Iron deficiency anemia due to chronic blood loss 11/01/2022    Jaundice     birth    Mental disorder 17    ADD, im being treated for borderline personality disorder, ocd, adhd, pmdd, etc    Seasonal allergies Born    Seizures Around 9, only arpund sunlight flickering        Interim hx:  Medication changes last visit:     Increase paroxetine (PAXIL) 20 MG tablet - Take 1 tablet (20 mg total) every evening for anxiety and depression.  Discussed potential for GI side effects, sexual dysfunction, mood destabilization, headaches.  Continue hydrOXYzine (ATARAX) 50 MG tablet - Take 1 tablet (50 mg total) by mouth 3 (three) times daily as needed for Anxiety.     Anxiety: Increased  Depression: Increased (situational to break-up)  Sleep: Same, no issues  Appetite: Same, no issues     Denies suicidal/homicidal ideations.  Denies hopelessness/worthlessness.    Denies auditory/visual hallucinations.      Tobacco:  vape nicotine  Alcohol:  denies  Drug use:  medical marijuana, tapering off  Caffeine:  1-2 can energy      Review of Systems   PSYCHIATRIC: Pertinent items are noted in the narrative.        CONSTITUTIONAL: weight stable        M/S: no pain today         ENT: no allergies noted today        ABD: no n/v/d     Past Medical, Family and Social History: The patient's past medical, family and social history have been reviewed and updated as appropriate within the electronic medical record. See encounter notes.     Medication Compliance: yes      Side effects: tolerates     Risk Parameters:  Patient reports no suicidal ideation  Patient reports no homicidal ideation  Patient reports no self-injurious behavior  Patient reports no violent behavior     Exam (detailed: at least 9 elements; comprehensive: all 15  elements)   Constitutional  Vitals:  Most recent vital signs, dated less than 90 days prior to this appointment, were reviewed.     General:  unremarkable, age appropriate, casual attire, good eye contact, good rapport       Musculoskeletal  Muscle Strength/Tone:  no flaccidity, no tremor    Gait & Station:  normal      Psychiatric                       Speech:  normal tone, normal rate, rhythm, and volume   Mood & Affect:   Euthymic, congruent, appropriate         Thought Process:   Goal directed; Linear    Associations:   intact   Thought Content:   No SI/HI, delusions, or paranoia, no AV/VH   Insight & Judgement:   Good, adequate to circumstances   Orientation:   grossly intact; alert and oriented x 4    Memory:  intact for content of interview    Language:  grossly intact, can repeat    Attention Span  : Grossly intact for content of interview   Fund of Knowledge:   intact and appropriate to age and level of education        Assessment and Diagnosis   Status/Progress: Based on the examination today, the patient's problem(s) is/are under fair control.  New problems have not been presented today. Comorbidities are not currently complicating management of the primary condition.      Impression:  Pt presents to OP Psychiatry for routine follow-up for treatment/medication management of LINO, MDD. Pt tearful and reports her girlfriend just broke up with her. She c/o rage and anger symptoms that worsen near her cycle. Recently had her IUD removed and her hormones have been unmanageable. Pt denies any self harm and states she does feel safe, but feels like an outsider. She states she doesn't have any friends and is lonely. Asked pt if she has looked into support groups and referred her information to possible group therapy at [facility]. Gave patient information on [facility] for support groups and resources. Told patient about 988 hotline if feeling unsafe. Discussed increasing Paxil to 40 mg, and replacing  Atarax with Inderal for anxiety, pt in agreement. Pt able to calm down and went over medication changes. Pt denies SI/HI/AVH, self-harm, plan, or intent. Pt verbalizes understanding of medication instructions through teach back. No other issues, concerns, or problems, will reassess symptoms and medications in 30 days or PRN if symptoms worsen.      Diagnosis:   - LINO (generalized anxiety disorder)  - MDD (major depressive disorder), recurrent episode, mild      Intervention/Counseling/Treatment Plan   Medication Management:      1.  Increase paroxetine (PAXIL) 40 MG tablet - Take 1 tablet (40 mg total) every evening for anxiety and depression.  Discussed potential for GI side effects, sexual dysfunction, mood destabilization, headaches.     2.  Discontinue hydrOXYzine (ATARAX) 50 MG tablet.    3.  Start propranoloL (INDERAL) 10 MG tablet - Take 1 tablet (10 mg total) by mouth 3 (three) times daily as needed (anxiety).     4.  Patient given contact # for psychotherapists at [facility] and also instructed she may check with insurance for list of providers.      5.  Call to report any worsening of symptoms or problems with the medication. Pt instructed to go to ER with thoughts of harming self, others.            Labs: none         Return to clinic:      30 days or PRN if symptoms worsen    -Spent 30min face to face with the pt; >50% time spent in counseling   -Supportive therapy and psychoeducation provided  -R/B/SE's of medications discussed with the pt who expresses understanding and chooses to take medications as prescribed.   -Pt instructed to call clinic, 911 or go to nearest emergency room if sxs worsen or pt is in   crisis. The pt expresses understanding.      Reena Tate NP  Psychiatric-Mental Health Nurse Practitioner  Department of Psychiatry    [facility] [place] [address] [place] [address]  Phone:  [phone]  Fax: [phone]

## 2024-04-17 ENCOUNTER — TELEPHONE (OUTPATIENT)
Dept: PSYCHIATRY | Facility: CLINIC | Age: 19
End: 2024-04-17
Payer: COMMERCIAL

## 2024-04-18 ENCOUNTER — OFFICE VISIT (OUTPATIENT)
Dept: PSYCHIATRY | Facility: CLINIC | Age: 19
End: 2024-04-18
Payer: COMMERCIAL

## 2024-04-18 ENCOUNTER — CLINICAL SUPPORT (OUTPATIENT)
Dept: PSYCHIATRY | Facility: CLINIC | Age: 19
End: 2024-04-18
Payer: COMMERCIAL

## 2024-04-18 DIAGNOSIS — F33.0 MDD (MAJOR DEPRESSIVE DISORDER), RECURRENT EPISODE, MILD: ICD-10-CM

## 2024-04-18 DIAGNOSIS — F98.8 ATTENTION DEFICIT DISORDER (ADD) WITHOUT HYPERACTIVITY: ICD-10-CM

## 2024-04-18 DIAGNOSIS — F41.1 GAD (GENERALIZED ANXIETY DISORDER): Primary | ICD-10-CM

## 2024-04-18 PROCEDURE — 99999 PR PBB SHADOW E&M-EST. PATIENT-LVL I: CPT | Mod: PBBFAC,,, | Performed by: COUNSELOR

## 2024-04-18 PROCEDURE — 90832 PSYTX W PT 30 MINUTES: CPT | Mod: 95,,, | Performed by: PSYCHOLOGIST

## 2024-04-18 PROCEDURE — 90837 PSYTX W PT 60 MINUTES: CPT | Mod: S$GLB,,, | Performed by: COUNSELOR

## 2024-04-18 NOTE — PROGRESS NOTES
"Individual Psychotherapy/IPT Group Screening (PhD)     DATE: 4/18/2024     Site:  [facility]          Therapeutic Intervention: Met with patient.  Outpatient - Supportive psychotherapy 30 min - CPT Code 98716     Chief complaint/reason for encounter: depression, anxiety, and interpersonal        Interval history and content of current session: Pt arrived on time to group therapy screening. Discussed pt's focus and goals in individual therapy. She reported history of fear of abandonment despite minimal attachment trauma in upbringing. She described "intentionally ruining [her] life" twice recently. She reported sabotaging an engagement and later ending her relationship with her girlfriend, so that they both can work on themselves. She also reported interpersonal pattern of allowing others to use her. Pt reported interest in group therapy and stated she feels excited although curious about inclusivity of others in the group, regarding LGBTQ identity. Discussed undersigned's role in maintaining safe and inclusive environment in group. Provided psychoeducation about group process and discussed structure of group. Pt stated she is available at group time for next 7 weekly sessions. Pt agreed to start next Tuesday.     Treatment plan:  Target symptoms: depression, anxiety  Why chosen therapy is appropriate versus another modality: relevant to diagnosis  Outcome monitoring methods: self-report  Therapeutic intervention type: supportive psychotherapy     Risk parameters:  Patient reports no suicidal ideation  Patient reports no homicidal ideation  Patient reports no self-injurious behavior  Patient reports no violent behavior     Verbal deficits: None     Patient's response to intervention:  The patient's response to intervention is accepting.     Progress toward goals and other mental status changes:  The patient's progress toward goals is fair .     Diagnosis:   MDD; LINO     Plan:  group psychotherapy     Return to " clinic: 1 week

## 2024-04-19 NOTE — PROGRESS NOTES
Individual Psychotherapy (PhD/LCSW)    4/18/2024    Site:  [place]         Therapeutic Intervention: Met with patient.  Outpatient - Insight oriented psychotherapy 60 min - CPT code 03838, Outpatient - Behavior modifying psychotherapy 60 min - CPT code 41731, and Outpatient - Supportive psychotherapy 60 min - CPT Code 93456    Chief complaint/reason for encounter: attention deficit, depression, and anxiety             Interval history and content of current session:  Patient presented for in clinic session.  Patient reported that she has been spiraling deeper into depression and has had trouble figuring  out ways to raise her mood.  Patient says that for several weeks now she has been extremely despondent, but she denied SI, HI and self-harm.  Patient just ended a recent relationship and stated that she is trying to process why she is so needy and often pushes people away.  Patient has poor sleep hygiene and poor appetite at this time.  Provider and patient processed some strategies that may help manage her depression that included participating in activities with friends and families and joining a support group.  She plans to start a [facility] IOP.  Provider and patient also listed outside activities like exercise and that patient should also include meditation, progressive muscle relaxation and or yoga.  Patient will start a gratitude journal as well. Patient does not want referral to inpatient mental health at this time.  Patient will address any medication concerns with her prescriber.  She will return as scheduled.     Treatment plan:  Target symptoms: depression, distractability, lack of focus, anxiety   Why chosen therapy is appropriate versus another modality: relevant to diagnosis, patient responds to this modality, evidence based practice  Outcome monitoring methods: self-report, observation  Therapeutic intervention type: insight oriented psychotherapy, behavior modifying psychotherapy, supportive  psychotherapy    Risk parameters:  Patient reports no suicidal ideation  Patient reports no homicidal ideation  Patient reports no self-injurious behavior  Patient reports no violent behavior    Verbal deficits: None    Patient's response to intervention:  The patient's response to intervention is accepting.    Progress toward goals and other mental status changes:  The patient's progress toward goals is poor.    Diagnosis:   No diagnosis found.    Plan:  individual psychotherapy Pt to go to ED or call 911 if symptoms worsen or if she has thoughts of harming self and/or others. Pt verbalized understanding.    Return to clinic: 2 weeks    Length of Service (minutes): 60      Each patient to whom he or she provides medical services by telemedicine is: (1) informed of the relationship between the physician and patient and the respective role of any other health care provider with respect to management of the patient; and (2) notified that he or she may decline to receive medical services by telemedicine and may withdraw from such care at any time.

## 2024-04-23 ENCOUNTER — CLINICAL SUPPORT (OUTPATIENT)
Dept: PSYCHIATRY | Facility: CLINIC | Age: 19
End: 2024-04-23
Payer: COMMERCIAL

## 2024-04-23 DIAGNOSIS — F33.0 MDD (MAJOR DEPRESSIVE DISORDER), RECURRENT EPISODE, MILD: ICD-10-CM

## 2024-04-23 DIAGNOSIS — F41.1 GAD (GENERALIZED ANXIETY DISORDER): Primary | ICD-10-CM

## 2024-04-23 PROCEDURE — 90853 GROUP PSYCHOTHERAPY: CPT | Mod: S$GLB,,, | Performed by: PSYCHOLOGIST

## 2024-04-23 PROCEDURE — 99999 PR PBB SHADOW E&M-EST. PATIENT-LVL I: CPT | Mod: PBBFAC,,, | Performed by: PSYCHOLOGIST

## 2024-04-29 ENCOUNTER — CLINICAL SUPPORT (OUTPATIENT)
Dept: PSYCHIATRY | Facility: CLINIC | Age: 19
End: 2024-04-29
Payer: COMMERCIAL

## 2024-04-29 DIAGNOSIS — F33.0 MDD (MAJOR DEPRESSIVE DISORDER), RECURRENT EPISODE, MILD: ICD-10-CM

## 2024-04-29 DIAGNOSIS — F98.8 ATTENTION DEFICIT DISORDER (ADD) WITHOUT HYPERACTIVITY: ICD-10-CM

## 2024-04-29 DIAGNOSIS — F41.1 GAD (GENERALIZED ANXIETY DISORDER): Primary | ICD-10-CM

## 2024-04-29 PROCEDURE — 99999 PR PBB SHADOW E&M-EST. PATIENT-LVL I: CPT | Mod: PBBFAC,,, | Performed by: COUNSELOR

## 2024-04-29 PROCEDURE — 90837 PSYTX W PT 60 MINUTES: CPT | Mod: S$GLB,,, | Performed by: COUNSELOR

## 2024-04-29 NOTE — PROGRESS NOTES
Individual Psychotherapy (PhD/LCSW)    4/29/2024    Site:  [place]         Therapeutic Intervention: Met with patient.  Outpatient - Insight oriented psychotherapy 60 min - CPT code 26280, Outpatient - Behavior modifying psychotherapy 60 min - CPT code 09190, and Outpatient - Supportive psychotherapy 60 min - CPT Code 89449    Chief complaint/reason for encounter: attention deficit, depression, and anxiety             Interval history and content of current session: Patient presented for in clinic session.  She denied SI, HI and self-harm.  She reported feeling relaxed and more sure of herself.  She is attempting to make new friends.  Patient has concerns about her parents, but has decided to focus on self.  She has not found a job yet.  She is sleeping well and appetite is good.  No medication concerns reported.  Will return as scheduled.     Treatment plan:  Target symptoms: depression, distractability, lack of focus, anxiety   Why chosen therapy is appropriate versus another modality: relevant to diagnosis, patient responds to this modality, evidence based practice  Outcome monitoring methods: self-report, observation  Therapeutic intervention type: insight oriented psychotherapy, behavior modifying psychotherapy, supportive psychotherapy    Risk parameters:  Patient reports no suicidal ideation  Patient reports no homicidal ideation  Patient reports no self-injurious behavior  Patient reports no violent behavior    Verbal deficits: None    Patient's response to intervention:  The patient's response to intervention is accepting.    Progress toward goals and other mental status changes:  The patient's progress toward goals is fair .    Diagnosis:     ICD-10-CM ICD-9-CM   1. LINO (generalized anxiety disorder)  F41.1 300.02   2. MDD (major depressive disorder), recurrent episode, mild  F33.0 296.31   3. Attention deficit disorder (ADD) without hyperactivity  F98.8 314.00       Plan:  individual psychotherapy Pt to  go to ED or call 911 if symptoms worsen or if she has thoughts of harming self and/or others. Pt verbalized understanding.    Return to clinic: 1 week    Length of Service (minutes): 60      Each patient to whom he or she provides medical services by telemedicine is: (1) informed of the relationship between the physician and patient and the respective role of any other health care provider with respect to management of the patient; and (2) notified that he or she may decline to receive medical services by telemedicine and may withdraw from such care at any time.

## 2024-04-30 ENCOUNTER — CLINICAL SUPPORT (OUTPATIENT)
Dept: PSYCHIATRY | Facility: CLINIC | Age: 19
End: 2024-04-30
Payer: COMMERCIAL

## 2024-04-30 ENCOUNTER — PATIENT MESSAGE (OUTPATIENT)
Dept: PSYCHIATRY | Facility: CLINIC | Age: 19
End: 2024-04-30
Payer: COMMERCIAL

## 2024-04-30 DIAGNOSIS — F41.1 GAD (GENERALIZED ANXIETY DISORDER): Primary | ICD-10-CM

## 2024-04-30 PROCEDURE — 90853 GROUP PSYCHOTHERAPY: CPT | Mod: S$GLB,,, | Performed by: PSYCHOLOGIST

## 2024-05-01 NOTE — TELEPHONE ENCOUNTER
Chart Update:  No involvement of care signed by pt for mom, pt contacted and pt does not wish mom to be involved, but did set up a sooner appointment to assess, and requests to dc group therapy

## 2024-05-01 NOTE — PROGRESS NOTES
Group Psychotherapy (PhD)     04/23/2024     Site:  [facility]          Therapeutic Intervention: Met with patient for group therapy     Group members: 7     Chief complaint/reason for encounter: depression, anxiety, and interpersonal       Interval history and content of current session: Pt arrived on time to 4th of 10 group sessions. Current session was pt's first group session. Introduced new group member. Several group members shared their compassionate concern for a group member who shared last session. Helped group member process the impact of this felt compassion. Another group member shared his success with using a communication tool with his wife, and several women in the group identified with some of his wife's harmful behavior. Another group member shared her concern about her daughter-in-law's parenting behavior with 5 y/o foster child. Another group member shared her sadness and hurt about her  and father of her daughter coming out as patrick late in their relationship. She agreed to process further in future sessions. Another group member opened up about her sexual orientation and her experiences of depersonalization. Discussed childhood experiences that led to depersonalization and how the group can help her regain sense of identity through their compassionate curiosity about her beliefs, feelings, and needs.        Treatment plan:  Target symptoms: depression, anxiety  Why chosen therapy is appropriate versus another modality: relevant to diagnosis  Outcome monitoring methods: self-report  Therapeutic intervention type: behavior modifying psychotherapy     Risk parameters:  Patient reports no suicidal ideation  Patient reports no homicidal ideation  Patient reports no self-injurious behavior  Patient reports no violent behavior     Verbal deficits: None     Patient's response to intervention:  The patient's response to intervention is accepting.     Progress toward goals and other  mental status changes:  The patient's progress toward goals is fair .     Diagnosis:   LINO, MDD     Plan:  Continue in group therapy     Return to clinic:: 1 week     Length of Service (minutes): 90

## 2024-05-02 NOTE — PROGRESS NOTES
"     Group Psychotherapy (PhD)     04/30/2024     Site:  [facility]          Therapeutic Intervention: Met with patient for group therapy     Group members: 7     Chief complaint/reason for encounter: depression, anxiety, and interpersonal       Interval history and content of current session: Pt arrived on time to 5th of 10 group sessions. One group member shared how she is in the process of buying a trailer to facilitate her separation from . She shared her daughter's hurtful, unsupportive response to this decision. Another group member shared that she walked in on her stepdaughter's wife and her  "pulling away from each other." Group provided her support in her decision-making regarding leaving her . Discussed her stepson's seemingly antisocial behavior and group's concern for this member's safety. She also shared info about [facility]'s CPS support. Another group member shared her confrontation of her son-in-law, and group provided support.        Treatment plan:  Target symptoms: depression, anxiety  Why chosen therapy is appropriate versus another modality: relevant to diagnosis  Outcome monitoring methods: self-report  Therapeutic intervention type: behavior modifying psychotherapy     Risk parameters:  Patient reports no suicidal ideation  Patient reports no homicidal ideation  Patient reports no self-injurious behavior  Patient reports no violent behavior     Verbal deficits: None     Patient's response to intervention:  The patient's response to intervention is accepting.     Progress toward goals and other mental status changes:  The patient's progress toward goals is fair .     Diagnosis:   LINO, MDD     Plan:  Continue in group therapy     Return to clinic:: 1 week     Length of Service (minutes): 90             "

## 2024-05-03 ENCOUNTER — OFFICE VISIT (OUTPATIENT)
Dept: PSYCHIATRY | Facility: CLINIC | Age: 19
End: 2024-05-03
Payer: COMMERCIAL

## 2024-05-03 DIAGNOSIS — F33.0 MDD (MAJOR DEPRESSIVE DISORDER), RECURRENT EPISODE, MILD: ICD-10-CM

## 2024-05-03 DIAGNOSIS — F41.1 GAD (GENERALIZED ANXIETY DISORDER): Primary | ICD-10-CM

## 2024-05-03 DIAGNOSIS — F51.02 INSOMNIA DUE TO PSYCHOLOGICAL STRESS: ICD-10-CM

## 2024-05-03 PROCEDURE — 99214 OFFICE O/P EST MOD 30 MIN: CPT | Mod: 95,,,

## 2024-05-03 PROCEDURE — 1159F MED LIST DOCD IN RCRD: CPT | Mod: CPTII,95,,

## 2024-05-03 PROCEDURE — 1160F RVW MEDS BY RX/DR IN RCRD: CPT | Mod: CPTII,95,,

## 2024-05-03 RX ORDER — TRAZODONE HYDROCHLORIDE 50 MG/1
50 TABLET ORAL NIGHTLY PRN
Qty: 30 TABLET | Refills: 1 | Status: SHIPPED | OUTPATIENT
Start: 2024-05-03 | End: 2024-05-20

## 2024-05-03 RX ORDER — PAROXETINE 10 MG/1
10 TABLET, FILM COATED ORAL NIGHTLY
Qty: 30 TABLET | Refills: 1 | Status: SHIPPED | OUTPATIENT
Start: 2024-05-03 | End: 2024-05-20 | Stop reason: DRUGHIGH

## 2024-05-03 NOTE — PROGRESS NOTES
Outpatient Psychiatry Follow-Up Visit    Clinical Status of Patient: Outpatient (Ambulatory)  05/03/2024    The patient location is:  [place], [place]  The patient phone number is: [phone]   Visit type: Virtual visit with synchronous audio and video  Each patient to whom he or she provides medical services by telemedicine is:  (1) informed of the relationship between the physician and patient and the respective role of any other health care provider with respect to management of the patient; and (2) notified that he or she may decline to receive medical services by telemedicine and may withdraw from such care at any time.     Chief Complaint: Pt is a 18 y.o. female who presents today for a follow-up. Met with patient.       Interval History and Content of Current Session:  Interim Events/Subjective Report/Content of Current Session:  follow up appointment.    Pt is a 18 y.o. female with past psychiatric hx of LINO (generalized anxiety disorder), MDD (major depressive disorder), recurrent episode, mild who presents for follow up treatment.     Past Psychiatric hx:   Pt. is a 19 yo F with a past psychiatric hx of Anxiety and depression, ADHD presenting to the clinic for an initial evaluation and treatment. Past medical history outlined below. She is not currently taking any psychotropic medications.      4/16/24: Pt presents to OP Psychiatry for routine follow-up for treatment/medication management of LINO, MDD. Pt tearful and reports her girlfriend just broke up with her. She c/o rage and anger symptoms that worsen near her cycle. Recently had her IUD removed and her hormones have been unmanageable. Pt denies any self harm and states she does feel safe, but feels like an outsider. She states she doesn't have any friends and is lonely. Asked pt if she has looked into support groups and referred her information to possible group therapy at [facility]. Gave patient information on [facility] for support  groups and resources. Told patient about 988 hotline if feeling unsafe. Discussed increasing Paxil to 40 mg, and replacing Atarax with Inderal for anxiety, pt in agreement. Pt able to calm down and went over medication changes. Pt denies SI/HI/AVH, self-harm, plan, or intent. Pt verbalizes understanding of medication instructions through teach back. No other issues, concerns, or problems, will reassess symptoms and medications in 30 days or PRN if symptoms worsen.     Past Medical hx:   Past Medical History:   Diagnosis Date    Acne 9 years old    Facial trauma Acne?? Very dry skin, dry eyes    Hearing loss My fault    Iron deficiency anemia due to chronic blood loss 11/01/2022    Jaundice     birth    Mental disorder 17    ADD, im being treated for borderline personality disorder, ocd, adhd, pmdd, etc    Seasonal allergies Born    Seizures Around 9, only arpund sunlight flickering        Interim hx:  Medication changes last visit:     1.  Increase paroxetine (PAXIL) 40 MG tablet - Take 1 tablet (40 mg total) every evening for anxiety and depression.  Discussed potential for GI side effects, sexual dysfunction, mood destabilization, headaches.  2.  Discontinue hydrOXYzine (ATARAX) 50 MG tablet.  3.  Start propranoloL (INDERAL) 10 MG tablet - Take 1 tablet (10 mg total) by mouth 3 (three) times daily as needed (anxiety).    Anxiety: Same, slight improvement  Depression: Same, slight improvement  Sleep: Worsened  Appetite: Same, no issues     Denies suicidal/homicidal ideations.  Denies hopelessness/worthlessness.    Denies auditory/visual hallucinations.      Tobacco:  vape nicotine  Alcohol:  denies  Drug use:  medical marijuana, tapering off  Caffeine:  1-2 can energy      Review of Systems   PSYCHIATRIC: Pertinent items are noted in the narrative.        CONSTITUTIONAL: weight stable        M/S: no pain today         ENT: no allergies noted today        ABD: no n/v/d     Past Medical, Family and Social  History: The patient's past medical, family and social history have been reviewed and updated as appropriate within the electronic medical record. See encounter notes.     Medication Compliance: yes      Side effects: tolerates     Risk Parameters:  Patient reports no suicidal ideation  Patient reports no homicidal ideation  Patient reports no self-injurious behavior  Patient reports no violent behavior     Exam (detailed: at least 9 elements; comprehensive: all 15 elements)   Constitutional  Vitals:  Most recent vital signs, dated less than 90 days prior to this appointment, were reviewed.     General:  unremarkable, age appropriate, casual attire, good eye contact, good rapport       Musculoskeletal  Muscle Strength/Tone:  no flaccidity, no tremor    Gait & Station:  normal      Psychiatric                       Speech:  normal tone, normal rate, rhythm, and volume   Mood & Affect:   Euthymic, congruent, appropriate         Thought Process:   Goal directed; Linear    Associations:   intact   Thought Content:   No SI/HI, delusions, or paranoia, no AV/VH   Insight & Judgement:   Good, adequate to circumstances   Orientation:   grossly intact; alert and oriented x 4    Memory:  intact for content of interview    Language:  grossly intact, can repeat    Attention Span  : Grossly intact for content of interview   Fund of Knowledge:   intact and appropriate to age and level of education        Assessment and Diagnosis   Status/Progress: Based on the examination today, the patient's problem(s) is/are under fair control.  New problems have not been presented today. Comorbidities are not currently complicating management of the primary condition.      Impression:  Pt presents to OP Psychiatry for routine follow-up for treatment/medication management of LINO, MDD. Pt reports slight improvement in anxiety and depression symptoms and has taken the Inderal PRN with effective results. Pt is committed to keeping busy and has been  staying active playing basketball, roller skating, and exercising to distract. Pt reports her sleep has been poor and sometimes she has slept on the floor because she can't any other way. Discussed increasing Paxil to 50 mg and starting Trazodone at bedtime PRN for insomnia. Instructed pt to contact if still not sleeping after 4-5 nights and we will increase dose to 100 mg until next 30 day FU appt. Pt verbalizes understanding of all medication changes and instructions. Pt denies SI/HI/AVH, self-harm, plan, or intent. Pt verbalizes understanding of medication instructions through teach back. No other issues, concerns, or problems, will reassess symptoms and medications in 30 days or PRN if symptoms worsen.      Diagnosis:   - LINO (generalized anxiety disorder)  - MDD (major depressive disorder), recurrent episode, mild  - Insomnia due to psychological stress       Intervention/Counseling/Treatment Plan   Medication Management:      1.  Increase paroxetine (PAXIL) 10 MG + 40 MG tablet - Take 1 tablet each (50 mg total) every evening for anxiety and depression.  Discussed potential for GI side effects, sexual dysfunction, mood destabilization, headaches.     2.  Continue propranoloL (INDERAL) 10 MG tablet - Take 1 tablet (10 mg total) by mouth 3 (three) times daily as needed (anxiety).     3.  Start traZODone (DESYREL) 50 MG tablet - Take 1 tablet (50 mg total) by mouth nightly as needed for Insomnia.      4. Call to report any worsening of symptoms or problems with the medication. Pt instructed to go to ER with thoughts of harming self, others.     5. Patient given contact # for psychotherapists at [facility] and also instructed she may check with insurance for list of providers.          Labs: none         Return to clinic:      30 days or PRN if symptoms worsen    -Spent 30min face to face with the pt; >50% time spent in counseling   -Supportive therapy and psychoeducation provided  -R/B/SE's of medications  discussed with the pt who expresses understanding and chooses to take medications as prescribed.   -Pt instructed to call clinic, 911 or go to nearest emergency room if sxs worsen or pt is in   crisis. The pt expresses understanding.      Reena Tate NP  Psychiatric-Mental Health Nurse Practitioner  Department of Psychiatry    [facility] [place] [address] [place] [address]  Phone:  [phone]  Fax: [phone]

## 2024-05-07 ENCOUNTER — TELEPHONE (OUTPATIENT)
Dept: PSYCHIATRY | Facility: CLINIC | Age: 19
End: 2024-05-07
Payer: COMMERCIAL

## 2024-05-09 ENCOUNTER — CLINICAL SUPPORT (OUTPATIENT)
Dept: PSYCHIATRY | Facility: CLINIC | Age: 19
End: 2024-05-09
Payer: COMMERCIAL

## 2024-05-09 DIAGNOSIS — F98.8 ATTENTION DEFICIT DISORDER (ADD) WITHOUT HYPERACTIVITY: ICD-10-CM

## 2024-05-09 DIAGNOSIS — F41.1 GAD (GENERALIZED ANXIETY DISORDER): Primary | ICD-10-CM

## 2024-05-09 DIAGNOSIS — F33.0 MDD (MAJOR DEPRESSIVE DISORDER), RECURRENT EPISODE, MILD: ICD-10-CM

## 2024-05-09 DIAGNOSIS — F51.02 INSOMNIA DUE TO PSYCHOLOGICAL STRESS: ICD-10-CM

## 2024-05-09 PROCEDURE — 90837 PSYTX W PT 60 MINUTES: CPT | Mod: 95,,, | Performed by: COUNSELOR

## 2024-05-09 NOTE — PROGRESS NOTES
Individual Psychotherapy (PhD/LCSW)    5/9/2024    Site:  [place]         The patient location Parish/City is:  [place]  The patient phone number is [phone]   Visit type: Virtual visit with synchronous audio and video  Each patient to whom he or she provides medical services by telemedicine is:  (1) informed of the relationship between the physician and patient and the respective role of any other health care provider with respect to management of the patient; and (2) notified that he or she may decline to receive medical services by telemedicine and may withdraw from such care at any time.  Crisis Disclaimer: Patient was informed that due to the virtual nature of the visit, that if a crisis develops, protocols will be implemented to ensure patient safety, including but not limited to: 1) Initiating a welfare check with local Law Enforcement, 2) Calling 1/National Crisis Hotline, and/or 3) Initiating PEC/CEC procedures.  Therapeutic Intervention: Met with patient.  Outpatient - Insight oriented psychotherapy 60 min - CPT code 06972, Outpatient - Behavior modifying psychotherapy 60 min - CPT code 83531, and Outpatient - Supportive psychotherapy 60 min - CPT Code 32682    Chief complaint/reason for encounter: attention deficit, depression, anxiety, and sleep             Interval history and content of current session: Reviewed chart.  Patient presented for virtual session.  Patient reported that she was feeling  better about herself.  She stated that she had let go some friends and is focusing on becoming a better person.  She is not working yet.  She sleeping fair, but has little to no appetite.  Patient was extremely distracted trying to masterson her lip.  Redirected to focus on session at hand.  She discussed the ways she was practicing self-compassion.  [place] new ways to add to her practice.  No medication concerns.  Denied SI, HI and self-harm.  Will return as scheduled.   Treatment plan:  Target symptoms:  depression, distractability, lack of focus, anxiety , insomnia  Why chosen therapy is appropriate versus another modality: relevant to diagnosis, patient responds to this modality, evidence based practice  Outcome monitoring methods: self-report, observation  Therapeutic intervention type: insight oriented psychotherapy, behavior modifying psychotherapy, supportive psychotherapy    Risk parameters:  Patient reports no suicidal ideation  Patient reports no homicidal ideation  Patient reports no self-injurious behavior  Patient reports no violent behavior    Verbal deficits: None    Patient's response to intervention:  The patient's response to intervention is accepting.    Progress toward goals and other mental status changes:  The patient's progress toward goals is fair .    Diagnosis:     ICD-10-CM ICD-9-CM   1. LINO (generalized anxiety disorder)  F41.1 300.02   2. MDD (major depressive disorder), recurrent episode, mild  F33.0 296.31   3. Insomnia due to psychological stress  F51.02 307.41   4. Attention deficit disorder (ADD) without hyperactivity  F98.8 314.00       Plan:  individual psychotherapy Pt to go to ED or call 911 if symptoms worsen or if she has thoughts of harming self and/or others. Pt verbalized understanding.    Return to clinic: 1 week    Length of Service (minutes): 60      Each patient to whom he or she provides medical services by telemedicine is: (1) informed of the relationship between the physician and patient and the respective role of any other health care provider with respect to management of the patient; and (2) notified that he or she may decline to receive medical services by telemedicine and may withdraw from such care at any time.

## 2024-05-10 ENCOUNTER — TELEPHONE (OUTPATIENT)
Dept: PSYCHIATRY | Facility: CLINIC | Age: 19
End: 2024-05-10
Payer: COMMERCIAL

## 2024-05-10 NOTE — TELEPHONE ENCOUNTER
Spoke with pt at last visit, which then she stated she does not want mom involved. Msg from mom was passed to Reena.

## 2024-05-10 NOTE — TELEPHONE ENCOUNTER
"                              For documentation purposes.      Patient mom Mrs. Ragland called requesting to speak with Reena Tate. Informed mom without an involvement of care on file we would only be able to receive information.     States, " Tamanna is being very erotic."  Asked Mom to explain erotic behavior. " I really can't explain, it's like a personality disorder." She hasn't said she wanted to hurt herself."    Mrs. Ragland was asking spouse how would he explain her behavior, dad stated very angry " we would say one thing she was say another." Very short angry she's yelling."    Asked mom if patient was home alone, states, " yes"   Asked mom how long with erotic behavior states, " couple weeks."    NO SI/HI    States. " I had to go get blood work done, but heading back to the house now.'' Tamanna won't see this message right?"   "Because if she knows about this, she's not going to trust us."    Informed mom message will go to patients provider oh[facility].    Encouraged mom if patient symptome's worsens she is needing to go to the nearest ER for further evaluation. If patient is not willing to go voluntarily, call 911.  Mom acknowledged understanding.  Meena"

## 2024-05-16 ENCOUNTER — CLINICAL SUPPORT (OUTPATIENT)
Dept: PSYCHIATRY | Facility: CLINIC | Age: 19
End: 2024-05-16
Payer: COMMERCIAL

## 2024-05-16 DIAGNOSIS — F51.02 INSOMNIA DUE TO PSYCHOLOGICAL STRESS: ICD-10-CM

## 2024-05-16 DIAGNOSIS — F98.8 ATTENTION DEFICIT DISORDER (ADD) WITHOUT HYPERACTIVITY: ICD-10-CM

## 2024-05-16 DIAGNOSIS — F41.1 GAD (GENERALIZED ANXIETY DISORDER): Primary | ICD-10-CM

## 2024-05-16 DIAGNOSIS — F33.0 MDD (MAJOR DEPRESSIVE DISORDER), RECURRENT EPISODE, MILD: ICD-10-CM

## 2024-05-16 PROCEDURE — 90837 PSYTX W PT 60 MINUTES: CPT | Mod: S$GLB,,, | Performed by: COUNSELOR

## 2024-05-16 PROCEDURE — 99999 PR PBB SHADOW E&M-EST. PATIENT-LVL I: CPT | Mod: PBBFAC,,, | Performed by: COUNSELOR

## 2024-05-16 NOTE — PROGRESS NOTES
Individual Psychotherapy (PhD/LCSW)    5/16/2024    Site:  [place]         Therapeutic Intervention: Met with patient.  Outpatient - Insight oriented psychotherapy 60 min - CPT code 84768, Outpatient - Behavior modifying psychotherapy 60 min - CPT code 19183, and Outpatient - Supportive psychotherapy 60 min - CPT Code 77444    Chief complaint/reason for encounter: attention deficit, depression, mood swings, anxiety, sleep, and appetite             Interval history and content of current session:  Patient presented for in clinic session.  Patient denied current SI, HI and self-harm.  Patient recently was discharged from [facility] after her mother had her involuntarily admitted.  Patient reported that her mother alleged that patient was a danger to herself and that her mood & behavior  appeared manic.  Patient denied that she felt that way.  However, patient presented with symptoms of paranoia and low self-esteem.  She reported persistent and pervasive distrust of others, assuming malicious intentions and conspiracy against her.  This paranoia has led to social withdrawal and isolation as patient has struggled to form and maintain relationships due to constant suspicion and mistrust.  Additionally, the patient exhibits low self-esteem, frequently expressing negative self talk, self doubt and a sense of inadequacy.  Patient struggles to acknowledge her strengths and accomplishments, is that focuses on perceived flaws  and shortcomings.  This negative self-image has led to difficulties in the certain themselves and setting boundaries, resulting in feelings of powerlessness and helplessness.  Treatment will focus on addressing these underlying issues through cognitive behavioral therapy, building coping strategies to manage paranoia and foster a more positive self image. Goals include improving relationships, increasing self confidence an enhancing overwhelmed mental well-being.  Patient denied any  medication concerns.  Patient stated that she wants better family relationships.  Patient is seeking employment to save money to go to beauty school.  Patient will return as scheduled.    Treatment plan:  Target symptoms: depression, distractability, lack of focus, anxiety , mood swings, insomnia  Why chosen therapy is appropriate versus another modality: relevant to diagnosis, patient responds to this modality, evidence based practice  Outcome monitoring methods: self-report, observation  Therapeutic intervention type: insight oriented psychotherapy, behavior modifying psychotherapy, supportive psychotherapy    Risk parameters:  Patient reports no suicidal ideation  Patient reports no homicidal ideation  Patient reports no self-injurious behavior  Patient reports no violent behavior    Verbal deficits: None    Patient's response to intervention:  The patient's response to intervention is accepting.    Progress toward goals and other mental status changes:  The patient's progress toward goals is limited.    Diagnosis:     ICD-10-CM ICD-9-CM   1. LINO (generalized anxiety disorder)  F41.1 300.02   2. MDD (major depressive disorder), recurrent episode, mild  F33.0 296.31   3. Insomnia due to psychological stress  F51.02 307.41   4. Attention deficit disorder (ADD) without hyperactivity  F98.8 314.00       Plan:  individual psychotherapy Pt to go to ED or call 911 if symptoms worsen or if she has thoughts of harming self and/or others. Pt verbalized understanding.    Return to clinic: as scheduled    Length of Service (minutes): 60      Each patient to whom he or she provides medical services by telemedicine is: (1) informed of the relationship between the physician and patient and the respective role of any other health care provider with respect to management of the patient; and (2) notified that he or she may decline to receive medical services by telemedicine and may withdraw from such care at any time.

## 2024-05-17 NOTE — PROGRESS NOTES
Outpatient Psychiatry Follow-Up Visit    Clinical Status of Patient: Outpatient (Ambulatory)  05/20/2024     Chief Complaint: Pt is a 18 y.o. non-binary who presents today for a follow-up. Met with patient.       Interval History and Content of Current Session:  Interim Events/Subjective Report/Content of Current Session:  follow up appointment.    Pt is a 18 y.o. non-binary with past psychiatric hx of LINO (generalized anxiety disorder), MDD (major depressive disorder), recurrent episode, mild, Insomnia due to psychological stress who presents for follow up treatment post IP Psych PEC at [facility].     Past Psychiatric hx:   Pt. is a 19 yo F with a past psychiatric hx of Anxiety and depression, ADHD presenting to the clinic for an initial evaluation and treatment. Past medical history outlined below. She is not currently taking any psychotropic medications.      5/3/24: Pt presents to OP Psychiatry for routine follow-up for treatment/medication management of LINO, MDD. Pt reports slight improvement in anxiety and depression symptoms and has taken the Inderal PRN with effective results. Pt is committed to keeping busy and has been staying active playing basketball, roller skating, and exercising to distract. Pt reports her sleep has been poor and sometimes she has slept on the floor because she can't any other way. Discussed increasing Paxil to 50 mg and starting Trazodone at bedtime PRN for insomnia. Instructed pt to contact if still not sleeping after 4-5 nights and we will increase dose to 100 mg until next 30 day FU appt. Pt verbalizes understanding of all medication changes and instructions. Pt denies SI/HI/AVH, self-harm, plan, or intent. Pt verbalizes understanding of medication instructions through teach back. No other issues, concerns, or problems, will reassess symptoms and medications in 30 days or PRN if symptoms worsen.     Past Medical hx:   Past Medical History:   Diagnosis Date     Acne 9 years old    Facial trauma Acne?? Very dry skin, dry eyes    Hearing loss My fault    Iron deficiency anemia due to chronic blood loss 11/01/2022    Jaundice     birth    Mental disorder 17    ADD, im being treated for borderline personality disorder, ocd, adhd, pmdd, etc    Seasonal allergies Born    Seizures Around 9, only arpund sunlight flickering        Interim hx:  Medication changes last visit:     1.  Increase paroxetine (PAXIL) 10 MG + 40 MG tablet - Take 1 tablet each (50 mg total) every evening for anxiety and depression.  Discussed potential for GI side effects, sexual dysfunction, mood destabilization, headaches.  2.  Continue propranoloL (INDERAL) 10 MG tablet - Take 1 tablet (10 mg total) by mouth 3 (three) times daily as needed (anxiety).  3.  Start traZODone (DESYREL) 50 MG tablet - Take 1 tablet (50 mg total) by mouth nightly as needed for Insomnia.     Anxiety: Worsened  Depression: Still c/o unresolved  Sleep: restful  Appetite: decreased     Denies suicidal/homicidal ideations.  Denies hopelessness/worthlessness.    Denies auditory/visual hallucinations.      Tobacco:  vape nicotine  Alcohol:  denies  Drug use:  medical marijuana, tapering off  Caffeine:  2-3 coffee      Review of Systems   PSYCHIATRIC: Pertinent items are noted in the narrative.        CONSTITUTIONAL: weight stable        M/S: no pain today         ENT: no allergies noted today        ABD: no n/v/d     Past Medical, Family and Social History: The patient's past medical, family and social history have been reviewed and updated as appropriate within the electronic medical record. See encounter notes.     Medication Compliance: yes      Side effects: tolerates     Risk Parameters:  Patient reports no suicidal ideation  Patient reports no homicidal ideation  Patient reports no self-injurious behavior  Patient reports no violent behavior     Exam (detailed: at least 9 elements; comprehensive: all 15 elements)    Constitutional  Vitals:  Most recent vital signs, dated less than 90 days prior to this appointment, were reviewed. Pulse:  [75]   BP: (105)/(73)     General:  unremarkable, age appropriate, casual attire, good eye contact, good rapport       Musculoskeletal  Muscle Strength/Tone:  no flaccidity, no tremor    Gait & Station:  normal      Psychiatric                       Speech:  normal tone, normal rate, rhythm, and volume   Mood & Affect:   Euthymic, congruent, appropriate         Thought Process:   Goal directed; Linear    Associations:   intact   Thought Content:   No SI/HI, delusions, or paranoia, no AV/VH   Insight & Judgement:   Good, adequate to circumstances   Orientation:   grossly intact; alert and oriented x 4    Memory:  intact for content of interview    Language:  grossly intact, can repeat    Attention Span  : Grossly intact for content of interview   Fund of Knowledge:   intact and appropriate to age and level of education        Assessment and Diagnosis   Status/Progress: Based on the examination today, the patient's problem(s) is/are under fair control.  New problems have not been presented today. Comorbidities are not currently complicating management of the primary condition.      Impression:  Pt presents to OP Psychiatry for routine follow-up for treatment/medication management of LINO, MDD, Insomnia. Pt reports improvement since starting Abilify. States that Trazodone was helpful but the 100 mg too strong. After reporting appetite has decreased significantly, discussed switching to Remeron for sleep and appetite. Pt in agreement. Pt reports Inderal helpful but it wears off so fast and does not quite resolve anxiety, discussed increasing and pt would like to try. Currently on 50 mg Paxil, but c/o unresolved depression and anxiety, panic attacks, discussed increasing Paxil to max dose. Instructed pt to look out for increased signs of self harm as is recommended in pts under 24 years old. Pt/mom  verbalize understanding. Pt has jumped in with both feet getting involved with her therapist and using the resources found on the [facility] website. Pt is making a conscious effort to work on her mental health and does not want to return to  Psych (recent [facility] admission). Pt denies SI/HI/AVH, self-harm, plan, or intent. Pt verbalizes understanding of medication instructions through teach back. No other issues, concerns, or problems, will reassess symptoms and medications in 30 days or PRN if symptoms worsen.      Diagnosis:   - LINO (generalized anxiety disorder)  - MDD (major depressive disorder), recurrent episode, mild  - Insomnia due to psychological stress       Intervention/Counseling/Treatment Plan   Medication Management:      1.  Increase paroxetine (PAXIL) 40 MG tablet - Take 1.5 tablets each (60 mg total) every evening for anxiety and depression.  Discussed potential for GI side effects, sexual dysfunction, mood destabilization, headaches.     2.  Increase propranoloL (INDERAL) 20 MG tablet - Take 1 tablet (20 mg total) by mouth 3 (three) times daily as needed (anxiety).     3.  Discontinue traZODone (DESYREL) 50 MG tablet.     4.  Start mirtazapine (REMERON) 30 MG tablet - Take 1 tablet (30 mg total) by mouth every evening for sleep/appetite.    5.  Continue ARIPiprazole (ABILIFY) 2 MG Tab - Take 1 tablet (2 mg total) by mouth once daily for depression (adjunct). Typical LUKE's reviewed including weight gain, abnormal movements, EPS, TD, metabolic side effects.      6.  Call to report any worsening of symptoms or problems with the medication. Pt instructed to go to ER with thoughts of harming self, others.     7.  Patient given contact # for psychotherapists at [facility] [place] and also instructed she may check with insurance for list of providers.     8.  Have pt remove mom as MyChart Proxy         Labs: none      Psychotherapy:   Target symptoms: inattention/distractibility, anxiety     Why chosen therapy is appropriate versus another modality: relevant to diagnosis, patient responds to this modality  Outcome monitoring methods: self-report, observation, feedback from family   Therapeutic intervention type: supportive psychotherapy  Topics discussed/themes: building skills sets for symptom management, symptom recognition, nutrition, exercise  The patient's response to the intervention is accepting. The patient's progress toward treatment goals is positive progress.  Duration of intervention: 20 minutes         Return to clinic:      30 days or PRN if symptoms worsen    -Spent 50min face to face with the pt; >50% time spent in counseling   -Supportive therapy and psychoeducation provided  -R/B/SE's of medications discussed with the pt who expresses understanding and chooses to take medications as prescribed.   -Pt instructed to call clinic, 911 or go to nearest emergency room if sxs worsen or pt is in   crisis. The pt expresses understanding.      Reena Tate NP  Psychiatric-Mental Health Nurse Practitioner  Department of Psychiatry    [facility] [place] [address] [place] [address]  Phone:  [phone]  Fax: [phone]

## 2024-05-20 ENCOUNTER — OFFICE VISIT (OUTPATIENT)
Dept: PSYCHIATRY | Facility: CLINIC | Age: 19
End: 2024-05-20
Payer: COMMERCIAL

## 2024-05-20 VITALS
HEART RATE: 75 BPM | SYSTOLIC BLOOD PRESSURE: 105 MMHG | DIASTOLIC BLOOD PRESSURE: 73 MMHG | HEIGHT: 65 IN | WEIGHT: 109.13 LBS | BODY MASS INDEX: 18.18 KG/M2

## 2024-05-20 DIAGNOSIS — F33.0 MDD (MAJOR DEPRESSIVE DISORDER), RECURRENT EPISODE, MILD: ICD-10-CM

## 2024-05-20 DIAGNOSIS — F41.1 GAD (GENERALIZED ANXIETY DISORDER): Primary | ICD-10-CM

## 2024-05-20 DIAGNOSIS — F51.02 INSOMNIA DUE TO PSYCHOLOGICAL STRESS: ICD-10-CM

## 2024-05-20 PROCEDURE — 90833 PSYTX W PT W E/M 30 MIN: CPT | Mod: S$GLB,,,

## 2024-05-20 PROCEDURE — 99999 PR PBB SHADOW E&M-EST. PATIENT-LVL III: CPT | Mod: PBBFAC,,,

## 2024-05-20 PROCEDURE — 3074F SYST BP LT 130 MM HG: CPT | Mod: CPTII,S$GLB,,

## 2024-05-20 PROCEDURE — 99214 OFFICE O/P EST MOD 30 MIN: CPT | Mod: S$GLB,,,

## 2024-05-20 PROCEDURE — 1159F MED LIST DOCD IN RCRD: CPT | Mod: CPTII,S$GLB,,

## 2024-05-20 PROCEDURE — 3008F BODY MASS INDEX DOCD: CPT | Mod: CPTII,S$GLB,,

## 2024-05-20 PROCEDURE — 3078F DIAST BP <80 MM HG: CPT | Mod: CPTII,S$GLB,,

## 2024-05-20 PROCEDURE — 1160F RVW MEDS BY RX/DR IN RCRD: CPT | Mod: CPTII,S$GLB,,

## 2024-05-20 RX ORDER — PAROXETINE HYDROCHLORIDE 40 MG/1
60 TABLET, FILM COATED ORAL NIGHTLY
Qty: 45 TABLET | Refills: 1 | Status: SHIPPED | OUTPATIENT
Start: 2024-05-20 | End: 2024-07-19

## 2024-05-20 RX ORDER — PROPRANOLOL HYDROCHLORIDE 20 MG/1
20 TABLET ORAL 3 TIMES DAILY PRN
Qty: 90 TABLET | Refills: 1 | Status: SHIPPED | OUTPATIENT
Start: 2024-05-20 | End: 2025-05-20

## 2024-05-20 RX ORDER — MIRTAZAPINE 30 MG/1
30 TABLET, FILM COATED ORAL NIGHTLY
Qty: 30 TABLET | Refills: 1 | Status: SHIPPED | OUTPATIENT
Start: 2024-05-20 | End: 2024-07-19

## 2024-05-20 RX ORDER — ARIPIPRAZOLE 2 MG/1
2 TABLET ORAL
COMMUNITY
Start: 2024-05-14 | End: 2024-05-20

## 2024-05-20 RX ORDER — ARIPIPRAZOLE 2 MG/1
2 TABLET ORAL DAILY
Qty: 30 TABLET | Refills: 1 | Status: SHIPPED | OUTPATIENT
Start: 2024-05-20 | End: 2024-07-19

## 2024-05-21 ENCOUNTER — OFFICE VISIT (OUTPATIENT)
Dept: FAMILY MEDICINE | Facility: CLINIC | Age: 19
End: 2024-05-21
Payer: COMMERCIAL

## 2024-05-21 VITALS
HEART RATE: 78 BPM | BODY MASS INDEX: 17.67 KG/M2 | SYSTOLIC BLOOD PRESSURE: 102 MMHG | HEIGHT: 65 IN | OXYGEN SATURATION: 99 % | DIASTOLIC BLOOD PRESSURE: 74 MMHG | WEIGHT: 106.06 LBS

## 2024-05-21 DIAGNOSIS — J30.1 NON-SEASONAL ALLERGIC RHINITIS DUE TO POLLEN: Primary | ICD-10-CM

## 2024-05-21 DIAGNOSIS — R63.4 WEIGHT LOSS: ICD-10-CM

## 2024-05-21 DIAGNOSIS — R11.0 NAUSEA: ICD-10-CM

## 2024-05-21 DIAGNOSIS — F51.02 INSOMNIA DUE TO PSYCHOLOGICAL STRESS: ICD-10-CM

## 2024-05-21 DIAGNOSIS — F33.0 MDD (MAJOR DEPRESSIVE DISORDER), RECURRENT EPISODE, MILD: ICD-10-CM

## 2024-05-21 DIAGNOSIS — F41.1 GAD (GENERALIZED ANXIETY DISORDER): ICD-10-CM

## 2024-05-21 DIAGNOSIS — R04.0 EPISTAXIS: ICD-10-CM

## 2024-05-21 PROBLEM — L01.00 IMPETIGO: Status: RESOLVED | Noted: 2018-04-26 | Resolved: 2024-05-21

## 2024-05-21 PROBLEM — R42 EPISODIC LIGHTHEADEDNESS: Status: RESOLVED | Noted: 2019-01-08 | Resolved: 2024-05-21

## 2024-05-21 PROCEDURE — 1159F MED LIST DOCD IN RCRD: CPT | Mod: CPTII,S$GLB,, | Performed by: INTERNAL MEDICINE

## 2024-05-21 PROCEDURE — 99214 OFFICE O/P EST MOD 30 MIN: CPT | Mod: S$GLB,,, | Performed by: INTERNAL MEDICINE

## 2024-05-21 PROCEDURE — 3008F BODY MASS INDEX DOCD: CPT | Mod: CPTII,S$GLB,, | Performed by: INTERNAL MEDICINE

## 2024-05-21 PROCEDURE — G2211 COMPLEX E/M VISIT ADD ON: HCPCS | Mod: S$GLB,,, | Performed by: INTERNAL MEDICINE

## 2024-05-21 PROCEDURE — 99999 PR PBB SHADOW E&M-EST. PATIENT-LVL IV: CPT | Mod: PBBFAC,,, | Performed by: INTERNAL MEDICINE

## 2024-05-21 PROCEDURE — 1160F RVW MEDS BY RX/DR IN RCRD: CPT | Mod: CPTII,S$GLB,, | Performed by: INTERNAL MEDICINE

## 2024-05-21 PROCEDURE — 3074F SYST BP LT 130 MM HG: CPT | Mod: CPTII,S$GLB,, | Performed by: INTERNAL MEDICINE

## 2024-05-21 PROCEDURE — 3078F DIAST BP <80 MM HG: CPT | Mod: CPTII,S$GLB,, | Performed by: INTERNAL MEDICINE

## 2024-05-21 RX ORDER — AZELASTINE 1 MG/ML
1 SPRAY, METERED NASAL 2 TIMES DAILY
Qty: 30 ML | Refills: 3 | Status: SHIPPED | OUTPATIENT
Start: 2024-05-21 | End: 2025-05-21

## 2024-05-21 RX ORDER — FLUTICASONE PROPIONATE 50 MCG
1 SPRAY, SUSPENSION (ML) NASAL DAILY
Qty: 16 G | Refills: 3 | Status: SHIPPED | OUTPATIENT
Start: 2024-05-21

## 2024-05-21 RX ORDER — ONDANSETRON 4 MG/1
4 TABLET, FILM COATED ORAL EVERY 8 HOURS PRN
Qty: 30 TABLET | Refills: 0 | Status: SHIPPED | OUTPATIENT
Start: 2024-05-21

## 2024-05-21 NOTE — PROGRESS NOTES
"[facility]  Primary Care   [address]       Patient ID: Tamanna Waters     Chief Complaint:   Chief Complaint   Patient presents with    Annual Exam     General wellness exam        HPI:  Routine follow-up.  It has been about 6 months since we last saw each other and in that time she was actually committed to a psychiatric facility for 72 hours because her friends and family thought she was going to hurt herself.  Since then she is established care with a psychiatrist in the [facility] and they are changing her medicines.  She is maintained on Paxil 60 mg a day and trazodone was stopped recently in favor Remeron at night for both depression and insomnia and hopefully some weight gain but she has yet to start that.  She continues on propranolol 3 times daily and Abilify.  I do hope this mix of medicines helps her out.  Weight loss/failure to gain weight has been an issue.  She admits to a lot of nausea after she eats so she does not eat much and that is probably the reason why she is losing weight.  She was constipated at 1 point but seems to be doing better now.  She requests Zofran which I will give her a limited supply and I want to see her back in a month to see how we doing.  I do recommend over-the-counter protein drinks twice daily just for good calories.  Allergies are a big issue as well.  She did better with the switch from Xyzal to Claritin but I will give her a prescription for Flonase and Astelin to see if that can work any better.  She also has epistaxis from her left Kumar frequently so I will refer her to ENT for possible cauterization.    Review of Systems       Weight loss    Objective:      Physical Exam   Physical Exam       Thin     Vitals:   Vitals:    05/21/24 0835   BP: 102/74   Pulse: 78   SpO2: 99%   Weight: 48.1 kg (106 lb 0.7 oz)   Height: 5' 5" (1.651 m)        Assessment:           Plan:       Tamanna Waters  was seen today for follow-up and may " need lab work.    Diagnoses and all orders for this visit:    Tamanna was seen today for annual exam.    Diagnoses and all orders for this visit:    Non-seasonal allergic rhinitis due to pollen  -     fluticasone propionate (FLONASE) 50 mcg/actuation nasal spray; 1 spray (50 mcg total) by Each Nostril route once daily.  -     azelastine (ASTELIN) 137 mcg (0.1 %) nasal spray; 1 spray (137 mcg total) by Nasal route 2 (two) times daily.  Monitor on meds     Epistaxis  -     Ambulatory referral/consult to ENT; Future  Moisturize nose with saline daily     Nausea  -     ondansetron (ZOFRAN) 4 MG tablet; Take 1 tablet (4 mg total) by mouth every 8 (eight) hours as needed for Nausea.  Monitor on Zofran     LINO (generalized anxiety disorder)  Plan per Psych     MDD (major depressive disorder), recurrent episode, mild  Plan per Psych     Insomnia due to psychological stress  Plan per Psych     Weight loss- try OTC protein drinks twice daily     Visit today included increased complexity associated with the care of the episodic problem weight loss nausea allergies  addressed and managing the longitudinal care of the patient due to the serious and/or complex managed problem(s) .          Richi Little MD

## 2024-05-23 ENCOUNTER — CLINICAL SUPPORT (OUTPATIENT)
Dept: PSYCHIATRY | Facility: CLINIC | Age: 19
End: 2024-05-23
Payer: COMMERCIAL

## 2024-05-23 DIAGNOSIS — F33.0 MDD (MAJOR DEPRESSIVE DISORDER), RECURRENT EPISODE, MILD: ICD-10-CM

## 2024-05-23 DIAGNOSIS — F41.1 GAD (GENERALIZED ANXIETY DISORDER): Primary | ICD-10-CM

## 2024-05-23 DIAGNOSIS — F51.02 INSOMNIA DUE TO PSYCHOLOGICAL STRESS: ICD-10-CM

## 2024-05-23 DIAGNOSIS — F98.8 ATTENTION DEFICIT DISORDER (ADD) WITHOUT HYPERACTIVITY: ICD-10-CM

## 2024-05-23 PROCEDURE — 90834 PSYTX W PT 45 MINUTES: CPT | Mod: S$GLB,,, | Performed by: COUNSELOR

## 2024-05-23 PROCEDURE — 99999 PR PBB SHADOW E&M-EST. PATIENT-LVL I: CPT | Mod: PBBFAC,,, | Performed by: COUNSELOR
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
## 2024-06-03 ENCOUNTER — OFFICE VISIT (OUTPATIENT)
Dept: OPTOMETRY | Facility: CLINIC | Age: 19
End: 2024-06-03
Payer: COMMERCIAL

## 2024-06-03 DIAGNOSIS — Z46.0 CONTACT LENS/GLASSES FITTING: ICD-10-CM

## 2024-06-03 DIAGNOSIS — H52.203 MYOPIA OF BOTH EYES WITH ASTIGMATISM: ICD-10-CM

## 2024-06-03 DIAGNOSIS — H43.393 VITREOUS FLOATERS, BILATERAL: ICD-10-CM

## 2024-06-03 DIAGNOSIS — Z46.0 CONTACT LENS/GLASSES FITTING: Primary | ICD-10-CM

## 2024-06-03 DIAGNOSIS — Z13.5 GLAUCOMA SCREENING: ICD-10-CM

## 2024-06-03 DIAGNOSIS — H52.13 MYOPIA OF BOTH EYES WITH ASTIGMATISM: ICD-10-CM

## 2024-06-03 DIAGNOSIS — Z01.00 EXAMINATION OF EYES AND VISION: Primary | ICD-10-CM

## 2024-06-03 PROCEDURE — 92310 CONTACT LENS FITTING OU: CPT | Mod: CSM,S$GLB,, | Performed by: OPTOMETRIST

## 2024-06-03 PROCEDURE — 99499 UNLISTED E&M SERVICE: CPT | Mod: ,,, | Performed by: OPTOMETRIST

## 2024-06-03 PROCEDURE — 92014 COMPRE OPH EXAM EST PT 1/>: CPT | Mod: S$GLB,,, | Performed by: OPTOMETRIST

## 2024-06-03 PROCEDURE — 1159F MED LIST DOCD IN RCRD: CPT | Mod: CPTII,S$GLB,, | Performed by: OPTOMETRIST

## 2024-06-03 PROCEDURE — 99999 PR PBB SHADOW E&M-EST. PATIENT-LVL III: CPT | Mod: PBBFAC,,, | Performed by: OPTOMETRIST

## 2024-06-03 PROCEDURE — 92015 DETERMINE REFRACTIVE STATE: CPT | Mod: S$GLB,,, | Performed by: OPTOMETRIST

## 2024-06-03 NOTE — PATIENT INSTRUCTIONS
"DRY EYES -- BURNING OR XENA SYMPTOMS:  Use Over The Counter artificial tears as needed for dry eye symptoms.   Some common brands include:  Systane, Optive, Refresh, and Thera-Tears.  These drops can be used as frequently as desired, but may be most helpful use during long periods of concentrated work.  For example, reading / working at the computer. Start with 3-4x per day.     Nighttime Ophthalmic gel or ointments are available: Refresh PM, Genteal, and Lacrilube.    Avoid drops that "get redness out" (Visine, Murine, Clear Eyes), as these may contain medication that could further irritate the eyes, especially with chronic use.    ALLERGY EYES -- ITCHING SYMPTOMS:  Over the counter medications include--Pataday, Zaditor, and Alaway.  Use as directed 1-2 drops daily for symptoms of itching / watering eyes.  These drops will not help for dry eye or exposure symptoms.    REDNESS RELIEF:  Lumify---is a good redness reliever that will not cause irritation if used chronically.        FLASHES / FLOATERS / POSTERIOR VITREOUS DETACHMENT    Call the clinic if you have any further changes in symptoms.  Including:  Increased numbers of floaters or flashing lights, dimness or darkness that moves through or stays constant in your vision, or any pain in the eye (s).    You may sometimes see small specks or clouds moving in your field of vision.  They are called FLOATERS.  You can often see them when looking at a plain background, like a blank wall or blue qian.  Floaters are actually tiny clumps of gel or cells inside the VITREOUS, the clear jelly-like fluid that fills the inside of your eye.    While these objects look like they are in front of your eye, they are actually floating inside.  What you see are the shadows they cast on the RETINA, the nerve layer at the back of the eye that senses light and allows you to see.      POSTERIOR VITREOUS DETACHMENT    The appearance of new floaters may be alarming.  If you suddenly " develop new floaters, you should contact your eye care professional  right away.    The retina can tear if the shrinking vitreous pulls away from the wall of the eye.  This sometimes causes a small amount of bleeding in the eye that may appear as new floaters.    A torn retina is always a serious problem, since it can lead to a retinal detachment.  You should see your eye care professional as soon as possible if:    even one new floater appears suddenly;  you see sudden flashes of light;  you notice other symptoms, like the loss of side vision, or a curtain closes down in your vision        POSTERIOR VITREOUS DETACHMENT is more common for people who:    are nearsighted;  have had cataract surgery;  have had YAG laser surgery of the eye;  have had inflammation inside the eye;  are over age 60.      While some floaters may remain visible, many of them will fade over time and become less noticeable.  Even if you've had some floaters for years, you should have your eyes checked as soon as possible if you notice new ones.    FLASHING LIGHTS    When the vitreous gel rubs or pulls on the retina, you may see what look like flashing lights or lightning streaks.  These flashes can appear off and on for several weeks or months.      Some people experience flashes of light that appear as jagged lines or heat waves in both eyes, lasting 10-20 minutes.  These flashes are caused by a spasm of blood vessels in the brain, which is called a migraine.    If a headache follows these flashes, it's called a migraine headache.  If   no headache occurs, these flashes are called Ophthalmic or Ocular Migraine.           DAILY WEAR CONTACT LENSES  Continue with Daily Wear of contact lenses, up to all waking hours.  Continue with approved contact lens disinfection / rewetting drops as discussed.  Dispose of lenses monthly.  Stop wearing your lenses and call our office if redness, blurred vision, or pain persists more than 12 hours.  We  recommend an annual eye exam for contact lens patients.
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
## 2024-06-04 ENCOUNTER — OFFICE VISIT (OUTPATIENT)
Dept: OTOLARYNGOLOGY | Facility: CLINIC | Age: 19
End: 2024-06-04
Payer: COMMERCIAL

## 2024-06-04 ENCOUNTER — TELEPHONE (OUTPATIENT)
Dept: FAMILY MEDICINE | Facility: CLINIC | Age: 19
End: 2024-06-04

## 2024-06-04 VITALS
WEIGHT: 110.25 LBS | BODY MASS INDEX: 18.37 KG/M2 | DIASTOLIC BLOOD PRESSURE: 78 MMHG | HEIGHT: 65 IN | SYSTOLIC BLOOD PRESSURE: 111 MMHG

## 2024-06-04 DIAGNOSIS — R04.0 EPISTAXIS: ICD-10-CM

## 2024-06-04 DIAGNOSIS — J31.0 NON-ALLERGIC RHINITIS: Primary | ICD-10-CM

## 2024-06-04 DIAGNOSIS — M26.623 BILATERAL TEMPOROMANDIBULAR JOINT PAIN: ICD-10-CM

## 2024-06-04 PROCEDURE — 3008F BODY MASS INDEX DOCD: CPT | Mod: CPTII,S$GLB,, | Performed by: STUDENT IN AN ORGANIZED HEALTH CARE EDUCATION/TRAINING PROGRAM

## 2024-06-04 PROCEDURE — 30901 CONTROL OF NOSEBLEED: CPT | Mod: LT,S$GLB,, | Performed by: STUDENT IN AN ORGANIZED HEALTH CARE EDUCATION/TRAINING PROGRAM

## 2024-06-04 PROCEDURE — 99214 OFFICE O/P EST MOD 30 MIN: CPT | Mod: 25,S$GLB,, | Performed by: STUDENT IN AN ORGANIZED HEALTH CARE EDUCATION/TRAINING PROGRAM

## 2024-06-04 PROCEDURE — 1159F MED LIST DOCD IN RCRD: CPT | Mod: CPTII,S$GLB,, | Performed by: STUDENT IN AN ORGANIZED HEALTH CARE EDUCATION/TRAINING PROGRAM

## 2024-06-04 PROCEDURE — 99999 PR PBB SHADOW E&M-EST. PATIENT-LVL III: CPT | Mod: PBBFAC,,, | Performed by: STUDENT IN AN ORGANIZED HEALTH CARE EDUCATION/TRAINING PROGRAM

## 2024-06-04 PROCEDURE — 3078F DIAST BP <80 MM HG: CPT | Mod: CPTII,S$GLB,, | Performed by: STUDENT IN AN ORGANIZED HEALTH CARE EDUCATION/TRAINING PROGRAM

## 2024-06-04 PROCEDURE — 3074F SYST BP LT 130 MM HG: CPT | Mod: CPTII,S$GLB,, | Performed by: STUDENT IN AN ORGANIZED HEALTH CARE EDUCATION/TRAINING PROGRAM

## 2024-06-04 RX ORDER — MUPIROCIN 20 MG/G
OINTMENT TOPICAL
Qty: 30 G | Refills: 1 | Status: SHIPPED | OUTPATIENT
Start: 2024-06-04

## 2024-06-04 NOTE — PROGRESS NOTES
Otolaryngology Clinic Note    Subjective:       Patient ID: Tamanna Waters is a 18 y.o. adult.    Chief Complaint: Epistaxis      History of Present Illness: Tamanna Waters is a 18 y.o. adult presenting with nosebleeds. Started after COVID in Feb. Still just bleeding on left. Did get nose piercing but was after this started. Twice a week. Lasts 10 minutes. Stuffs tissue in nose when it bleeds. Has tried pinching. Tried flonase per ENT visit. Right ear hurting as well, started 2 days.   Takes claritin daily (has tried others and claritin works better). Is still stuffy, has PND. Using astelin BID, and flonase daily. Allergy panel negative. No issues prior to Feb. Was living with cats, better away from them. Grinds/clenches teeth at night. Pain in cheeks/temples.      No past surgical history on file.  Past Medical History:   Diagnosis Date    Acne 9 years old    Facial trauma Acne?? Very dry skin, dry eyes    Hearing loss My fault    Iron deficiency anemia due to chronic blood loss 11/01/2022    Jaundice     birth    Mental disorder 17    ADD, im being treated for borderline personality disorder, ocd, adhd, pmdd, etc    Seasonal allergies Born    Seizures Around 9, only arpund sunlight flickering     Social Determinants of Health     Tobacco Use: High Risk (6/4/2024)    Patient History     Smoking Tobacco Use: Some Days     Smokeless Tobacco Use: Never     Passive Exposure: Not on file   Alcohol Use: Not At Risk (1/22/2024)    AUDIT-C     Frequency of Alcohol Consumption: Never     Average Number of Drinks: Patient does not drink     Frequency of Binge Drinking: Never   Financial Resource Strain: Low Risk  (1/22/2024)    Overall Financial Resource Strain (CARDIA)     Difficulty of Paying Living Expenses: Not hard at all   Food Insecurity: Food Insecurity Present (1/22/2024)    Hunger Vital Sign     Worried About Running Out of Food in the Last Year: Often true     Ran Out of Food in the Last Year: Often  true   Transportation Needs: No Transportation Needs (1/22/2024)    PRAPARE - Transportation     Lack of Transportation (Medical): No     Lack of Transportation (Non-Medical): No   Physical Activity: Sufficiently Active (1/22/2024)    Exercise Vital Sign     Days of Exercise per Week: 7 days     Minutes of Exercise per Session: 120 min   Stress: Stress Concern Present (1/22/2024)    [employer] - Occupational Stress Questionnaire     Feeling of Stress : To some extent   Housing Stability: High Risk (1/22/2024)    Housing Stability Vital Sign     Unable to Pay for Housing in the Last Year: Yes     Number of Places Lived in the Last Year: 2     Unstable Housing in the Last Year: Yes   Depression: Low Risk  (3/7/2023)    Depression     Last PHQ-4: Flowsheet Data: 0   Utilities: Not on file   Health Literacy: Not on file   Social Isolation: Not on file     Review of patient's allergies indicates:   Allergen Reactions    Doxycycline      Nausea and stomach pain     Phenergan [promethazine]      Dizziness    Wellbutrin [bupropion hcl]      Hx possible seizure activity    Zoloft [sertraline]      Nausea     Current Outpatient Medications   Medication Instructions    ARIPiprazole (ABILIFY) 2 mg, Oral, Daily    azelastine (ASTELIN) 137 mcg, Nasal, 2 times daily    fluticasone propionate (FLONASE) 50 mcg, Each Nostril, Daily    levocetirizine (XYZAL) 5 mg, Oral, Nightly    mirtazapine (REMERON) 30 mg, Oral, Nightly    mupirocin (BACTROBAN) 2 % ointment Apply pea sized amount to inside of nostrils twice daily for 10 days    ondansetron (ZOFRAN) 4 mg, Oral, Every 8 hours PRN    paroxetine (PAXIL) 60 mg, Oral, Nightly    propranoloL (INDERAL) 20 mg, Oral, 3 times daily PRN         ENT ROS negative except as stated above.     Patient answers are not available for this visit.            Objective:      Vitals:    06/04/24 0824   BP: 111/78       General: NAD, well appearing  Eyes: Normal conjunctiva  and lids  Face: symmetric, nerve intact  Nose: The nose is without any evidence of any deformity. The nasal mucosa is inflamed with prominent vessel x 2 left septum. The septum deviated left mildly. There is no evidence of septal hematoma. The turbinates are without abnormality.   Ears: The ears are with normal-appearing pinna. Examination of the canals is normal appearing bilaterally. There is no drainage or erythema noted. The tympanic membranes are normal appearing with pearly color, normal-appearing landmarks and normal light reflex. Hearing is grossly intact. Valsalva +  Mouth: No obvious abnormalities to the lips. The teeth are unremarkable. The gingivae are without any obvious evidence of infection or lesion. The oral mucosa is moist and pink. There are no obvious masses to the hard or soft palate. Tender at TMJ  Oropharynx: The uvula is midline.  The tongue is midline. The posterior pharynx is without erythema or exudate. The tonsils are normal appearing.  Salivary glands: The salivary glands are symmetric and not enlarged, no masses  Neck: No lymphadenopathy, trachea midline, thryoid not enlarged.  Psych: Normal mood and affect.   Neuro: Grossly intact  Speech: fluent    Name: Tamanna Waters     Pre-procedure diagnosis: Non-allergic rhinitis [J31.0]  Post-procedure diagnosis: Same    Procedure: Control of anterior epistaxis, simple  Anesthesia:  2% Lidocaine  Performed by: Eunice Croft MD    Procedure: Risks, benefits, and alternatives of the procedure were discussed with the patient. Risks include continue / recurrent epistaxis, crusting, pain, and septal perforation. They agreed to the procedure. The nasal septum was anesthestized with Lidocaine over a cotton ball. After adequate anesthesia was obtained, the prominent vessels were identified on the left and were controlled with silver nitrate. There was no bleeding. Patient tolerated well. Mupirocin ointment was placed over the area. The patient was  discharged in stable condition.         Assessment and Plan:       1. Non-allergic rhinitis    2. Epistaxis    3. Bilateral temporomandibular joint pain        Cautery today  Mupirocin BID 10 days  No noseblowing 7 days  Saline spray 2-3 times a day  No flonase/atelin 2 weeks, can restart if needed  Reviewed TMJ measures, mouth guard, ibuprofen, warm compresses    RTC: PRN    Plan of care was discussed in detail with the patient, who agreed with the plan as above. All questions were answered in detail.     Eunice Croft MD  Otolaryngology

## 2024-06-12 ENCOUNTER — PATIENT MESSAGE (OUTPATIENT)
Dept: OPTOMETRY | Facility: CLINIC | Age: 19
End: 2024-06-12
Payer: COMMERCIAL

## 2024-06-13 ENCOUNTER — PATIENT MESSAGE (OUTPATIENT)
Dept: PSYCHIATRY | Facility: CLINIC | Age: 19
End: 2024-06-13
Payer: COMMERCIAL

## 2024-06-13 ENCOUNTER — PATIENT MESSAGE (OUTPATIENT)
Dept: FAMILY MEDICINE | Facility: CLINIC | Age: 19
End: 2024-06-13
Payer: COMMERCIAL

## 2024-06-17 ENCOUNTER — CLINICAL SUPPORT (OUTPATIENT)
Dept: PSYCHIATRY | Facility: CLINIC | Age: 19
End: 2024-06-17
Payer: COMMERCIAL

## 2024-06-17 DIAGNOSIS — F41.1 GAD (GENERALIZED ANXIETY DISORDER): Primary | ICD-10-CM

## 2024-06-17 DIAGNOSIS — F98.8 ATTENTION DEFICIT DISORDER (ADD) WITHOUT HYPERACTIVITY: ICD-10-CM

## 2024-06-17 DIAGNOSIS — F51.02 INSOMNIA DUE TO PSYCHOLOGICAL STRESS: ICD-10-CM

## 2024-06-17 DIAGNOSIS — F33.0 MDD (MAJOR DEPRESSIVE DISORDER), RECURRENT EPISODE, MILD: ICD-10-CM

## 2024-06-17 PROCEDURE — 90837 PSYTX W PT 60 MINUTES: CPT | Mod: 95,,, | Performed by: COUNSELOR

## 2024-06-18 ENCOUNTER — TELEPHONE (OUTPATIENT)
Dept: OBSTETRICS AND GYNECOLOGY | Facility: CLINIC | Age: 19
End: 2024-06-18
Payer: COMMERCIAL

## 2024-06-18 NOTE — TELEPHONE ENCOUNTER
Called pt and lvm. Asked her to call back to r/s so she didn't have to waste a trip coming out here since she needs to see [facility] first.

## 2024-06-18 NOTE — TELEPHONE ENCOUNTER
Called pt and left voicemail. Asked her to call back so we can reschedule her appointment today. She needs to go through [facility] before [facility]

## 2024-06-19 NOTE — PROGRESS NOTES
Individual Psychotherapy (PhD/LCSW)    6/17/2024    Site:  [place]     The patient location [place] is:  [place]  The patient phone number is [phone]   Visit type: Virtual visit with synchronous audio and video  Each patient to whom he or she provides medical services by telemedicine is:  (1) informed of the relationship between the physician and patient and the respective role of any other health care provider with respect to management of the patient; and (2) notified that he or she may decline to receive medical services by telemedicine and may withdraw from such care at any time.  Crisis Disclaimer: Patient was informed that due to the virtual nature of the visit, that if a crisis develops, protocols will be implemented to ensure patient safety, including but not limited to: 1) Initiating a welfare check with local Law Enforcement, 2) Calling 1/National Crisis Hotline, and/or 3) Initiating PEC/CEC procedures.        Therapeutic Intervention: Met with patient.  Outpatient - Insight oriented psychotherapy 30 min - CPT code 43713, Outpatient - Behavior modifying psychotherapy 30 min - CPT code 12659, and Outpatient - Supportive psychotherapy 30 min - CPT Code 75891    Chief complaint/reason for encounter: attention deficit, depression, mood swings, mood elevation, anxiety, poor  sleep, and impulsivity              Interval history and content of current session: Patient presented for virtual session.  Patient shared that she had checked herself out of a facility that her parents had taken her to for treatment.  She reported that the facility was for addiction treatment and that she didn't need it.  She stated that she found out that she is pregnant and is now determined to get mentally and physically healthy to care for the child.  Advised patient to seek ob-gyn care and to stay medication compliant as well as practice good diet, exercise and sleep care.  Patient is anxious to work and find independent  housing. Provided resources for both.  Patient denied SI, HI and self-harm.  She denied paranoia, delusions and hallucinations. There was no evidence of such during visit.  Patient struggles with maintaining a health relations with her parents and will continue work on strategies to improve communication with them. Patient will return as scheduled.     Treatment plan:  Target symptoms: distractability, lack of focus, anxiety , mood swings  Why chosen therapy is appropriate versus another modality: relevant to diagnosis, patient responds to this modality, evidence based practice  Outcome monitoring methods: self-report, observation  Therapeutic intervention type: insight oriented psychotherapy, behavior modifying psychotherapy, supportive psychotherapy    Risk parameters:  Patient reports no suicidal ideation  Patient reports no homicidal ideation  Patient reports no self-injurious behavior  Patient reports no violent behavior    Verbal deficits: None    Patient's response to intervention:  The patient's response to intervention is accepting.    Progress toward goals and other mental status changes:  The patient's progress toward goals is poor.    Diagnosis:     ICD-10-CM ICD-9-CM   1. LINO (generalized anxiety disorder)  F41.1 300.02   2. MDD (major depressive disorder), recurrent episode, mild  F33.0 296.31   3. Insomnia due to psychological stress  F51.02 307.41   4. Attention deficit disorder (ADD) without hyperactivity  F98.8 314.00       Plan:  individual psychotherapy Pt to go to ED or call 911 if symptoms worsen or if Tamanna has thoughts of harming self and/or others. Pt verbalized understanding.    Return to clinic: 1 week    Length of Service (minutes): 60      Each patient to whom he or she provides medical services by telemedicine is: (1) informed of the relationship between the physician and patient and the respective role of any other health care provider with respect to management of the patient; and  (2) notified that he or she may decline to receive medical services by telemedicine and may withdraw from such care at any time.

## 2024-06-21 ENCOUNTER — TELEPHONE (OUTPATIENT)
Dept: OBSTETRICS AND GYNECOLOGY | Facility: CLINIC | Age: 19
End: 2024-06-21
Payer: COMMERCIAL

## 2024-06-23 ENCOUNTER — PATIENT MESSAGE (OUTPATIENT)
Dept: OPTOMETRY | Facility: CLINIC | Age: 19
End: 2024-06-23
Payer: COMMERCIAL

## 2024-06-24 ENCOUNTER — PATIENT MESSAGE (OUTPATIENT)
Dept: OPTOMETRY | Facility: CLINIC | Age: 19
End: 2024-06-24
Payer: COMMERCIAL

## 2024-06-25 ENCOUNTER — PATIENT MESSAGE (OUTPATIENT)
Dept: PSYCHIATRY | Facility: CLINIC | Age: 19
End: 2024-06-25
Payer: COMMERCIAL

## 2024-06-25 NOTE — TELEPHONE ENCOUNTER
Please call mom and tell her that we do not have an IOC and have specifically been told by the pt that she does not want anyone to have access to her confidential information. This is NOT negotiable, if she feels the pt is a danger to herself or others, she needs to take her for an evaluation at the nearest ER or call 911. Our office is an outpatient clinic and emergency situations require a higher level of care. We are ONLY able to speak to the patient, and if she calls in crisis, our response is to go to the ER.    Chart Update:  This has been escalated to Management.

## 2024-06-25 NOTE — PROGRESS NOTES
DC current meds (pt stopped taking several days ago) until follow up visit. Pt has not contacted this office with any complaints or issues, Mom reported pt went to rehab for 2 months. No IOC on Mom as pt specifically denied Mom access to HIPAA protected information. Mom states that she was pregnant even though labs are negative. Will send pt message to schedule a follow up ASAP, Medications have been dc until FU with pt. Pt currently at [facility] for evaluation.

## 2024-06-27 ENCOUNTER — OFFICE VISIT (OUTPATIENT)
Dept: FAMILY MEDICINE | Facility: CLINIC | Age: 19
End: 2024-06-27
Payer: COMMERCIAL

## 2024-06-27 ENCOUNTER — HOSPITAL ENCOUNTER (OUTPATIENT)
Dept: RADIOLOGY | Facility: HOSPITAL | Age: 19
Discharge: HOME | End: 2024-06-27
Attending: INTERNAL MEDICINE
Payer: COMMERCIAL

## 2024-06-27 VITALS
DIASTOLIC BLOOD PRESSURE: 72 MMHG | WEIGHT: 117.31 LBS | SYSTOLIC BLOOD PRESSURE: 108 MMHG | BODY MASS INDEX: 19.54 KG/M2 | HEIGHT: 65 IN | OXYGEN SATURATION: 99 % | HEART RATE: 61 BPM

## 2024-06-27 DIAGNOSIS — M79.672 LEFT FOOT PAIN: ICD-10-CM

## 2024-06-27 DIAGNOSIS — M79.672 LEFT FOOT PAIN: Primary | ICD-10-CM

## 2024-06-27 PROCEDURE — 3074F SYST BP LT 130 MM HG: CPT | Mod: CPTII,S$GLB,, | Performed by: INTERNAL MEDICINE

## 2024-06-27 PROCEDURE — 3008F BODY MASS INDEX DOCD: CPT | Mod: CPTII,S$GLB,, | Performed by: INTERNAL MEDICINE

## 2024-06-27 PROCEDURE — 3078F DIAST BP <80 MM HG: CPT | Mod: CPTII,S$GLB,, | Performed by: INTERNAL MEDICINE

## 2024-06-27 PROCEDURE — 99213 OFFICE O/P EST LOW 20 MIN: CPT | Mod: S$GLB,,, | Performed by: INTERNAL MEDICINE

## 2024-06-27 PROCEDURE — 73630 X-RAY EXAM OF FOOT: CPT | Mod: 26,LT,, | Performed by: RADIOLOGY

## 2024-06-27 PROCEDURE — 73630 X-RAY EXAM OF FOOT: CPT | Mod: TC,FY,PO,LT

## 2024-06-27 PROCEDURE — 1160F RVW MEDS BY RX/DR IN RCRD: CPT | Mod: CPTII,S$GLB,, | Performed by: INTERNAL MEDICINE

## 2024-06-27 PROCEDURE — 1159F MED LIST DOCD IN RCRD: CPT | Mod: CPTII,S$GLB,, | Performed by: INTERNAL MEDICINE

## 2024-06-27 PROCEDURE — 99999 PR PBB SHADOW E&M-EST. PATIENT-LVL III: CPT | Mod: PBBFAC,,, | Performed by: INTERNAL MEDICINE

## 2024-06-27 NOTE — PROGRESS NOTES
"[facility]  Primary Care   [address]       Patient ID: Tamanna Waters     Chief Complaint:   Chief Complaint   Patient presents with    Foot Pain        HPI: Patient Complains of Left foot pain x 1 month since slipping in the tub. She can walk on her Left foot but has some swelling around her MTP area of her #4 & 5 toes. Will get XR today.     Review of Systems       Left foot pain     Objective:      Physical Exam   Physical Exam       Some subtle swelling in Left foot near #4 & 5 MTP joints    Vitals:   Vitals:    06/27/24 1612   BP: 108/72   Pulse: 61   SpO2: 99%   Weight: 53.2 kg (117 lb 4.6 oz)   Height: 5' 5" (1.651 m)        Assessment:           Plan:       Tamanna Waters  was seen today for follow-up and may need lab work.    Diagnoses and all orders for this visit:    Tamanna was seen today for foot pain.    Diagnoses and all orders for this visit:    Left foot pain  -     X-Ray Foot Complete Left; Future           Richi Little MD    "

## 2024-06-28 ENCOUNTER — CLINICAL SUPPORT (OUTPATIENT)
Dept: PSYCHIATRY | Facility: CLINIC | Age: 19
End: 2024-06-28
Payer: COMMERCIAL

## 2024-06-28 DIAGNOSIS — F51.02 INSOMNIA DUE TO PSYCHOLOGICAL STRESS: ICD-10-CM

## 2024-06-28 DIAGNOSIS — F98.8 ATTENTION DEFICIT DISORDER (ADD) WITHOUT HYPERACTIVITY: ICD-10-CM

## 2024-06-28 DIAGNOSIS — F41.1 GAD (GENERALIZED ANXIETY DISORDER): Primary | ICD-10-CM

## 2024-06-28 DIAGNOSIS — F33.0 MDD (MAJOR DEPRESSIVE DISORDER), RECURRENT EPISODE, MILD: ICD-10-CM

## 2024-06-28 PROCEDURE — 90832 PSYTX W PT 30 MINUTES: CPT | Mod: 95,,, | Performed by: COUNSELOR

## 2024-06-28 NOTE — PROGRESS NOTES
Outpatient Psychiatry Follow-Up Visit    Clinical Status of Patient: Outpatient (Ambulatory)  07/03/2024     Chief Complaint: Pt is a 18 y.o. non-binary who presents today for a follow-up. Met with patient.       Interval History and Content of Current Session:  Interim Events/Subjective Report/Content of Current Session:  follow up appointment.    Pt is a 18 y.o. non-binary with past psychiatric hx of LINO (generalized anxiety disorder), MDD (major depressive disorder), recurrent episode, mild, Insomnia due to psychological stress who presents for follow up treatment.     Past Psychiatric hx:   Pt. is a 19 yo non-binary with a past psychiatric hx of Anxiety and depression, ADHD presenting to the clinic for an initial evaluation and treatment. Past medical history outlined below. They are not currently taking any psychotropic medications.      5/20/24: Pt presents to OP Psychiatry for routine follow-up for treatment/medication management of LINO, MDD, Insomnia. Pt reports improvement since starting Abilify. States that Trazodone was helpful but the 100 mg too strong. After reporting appetite has decreased significantly, discussed switching to Remeron for sleep and appetite. Pt in agreement. Pt reports Inderal helpful but it wears off so fast and does not quite resolve anxiety, discussed increasing and pt would like to try. Currently on 50 mg Paxil, but c/o unresolved depression and anxiety, panic attacks, discussed increasing Paxil to max dose. Instructed pt to look out for increased signs of self harm as is recommended in pts under 24 years old. Pt/mom verbalize understanding. Pt has jumped in with both feet getting involved with their therapist and using the resources found on the [facility] website. Pt is making a conscious effort to work on their mental health and does not want to return to  Psych (recent [facility] admission). Pt denies SI/HI/AVH, self-harm, plan, or intent. Pt verbalizes understanding  of medication instructions through teach back. No other issues, concerns, or problems, will reassess symptoms and medications in 30 days or PRN if symptoms worsen.     Past Medical hx:   Past Medical History:   Diagnosis Date    Acne 9 years old    Facial trauma Acne?? Very dry skin, dry eyes    Hearing loss My fault    Iron deficiency anemia due to chronic blood loss 11/01/2022    Jaundice     birth    Mental disorder 17    ADD, im being treated for borderline personality disorder, ocd, adhd, pmdd, etc    Seasonal allergies Born    Seizures Around 9, only arpund sunlight flickering        Interim hx:  Medication changes last visit:     1.  Increase paroxetine (PAXIL) 40 MG tablet - Take 1.5 tablets each (60 mg total) every evening for anxiety and depression.  Discussed potential for GI side effects, sexual dysfunction, mood destabilization, headaches.  2.  Increase propranoloL (INDERAL) 20 MG tablet - Take 1 tablet (20 mg total) by mouth 3 (three) times daily as needed (anxiety).  3.  Discontinue traZODone (DESYREL) 50 MG tablet.   4.  Start mirtazapine (REMERON) 30 MG tablet - Take 1 tablet (30 mg total) by mouth every evening for sleep/appetite.  5.  Continue ARIPiprazole (ABILIFY) 2 MG Tab - Take 1 tablet (2 mg total) by mouth once daily for depression (adjunct). Typical LUKE's reviewed including weight gain, abnormal movements, EPS, TD, metabolic side effects.   6.  Have pt remove mom as MyChart Proxy    Anxiety: Improved after coming off medication  Depression: Improved, manageable  Sleep: Good, manageable  Appetite: Same, no issues     Denies suicidal/homicidal ideations.  Denies hopelessness/worthlessness.    Denies auditory/visual hallucinations.      Tobacco:  vape nicotine  Alcohol:  denies  Drug use:  medical marijuana, tapering off  Caffeine:  2-3 coffee       Review of Systems   PSYCHIATRIC: Pertinent items are noted in the narrative.        CONSTITUTIONAL: weight stable        M/S: no pain today          ENT: no allergies noted today        ABD: no n/v/d     Past Medical, Family and Social History: The patient's past medical, family and social history have been reviewed and updated as appropriate within the electronic medical record. See encounter notes.     Medication Compliance: yes      Side effects: tolerates     Risk Parameters:  Patient reports no suicidal ideation  Patient reports no homicidal ideation  Patient reports no self-injurious behavior  Patient reports no violent behavior     Exam (detailed: at least 9 elements; comprehensive: all 15 elements)   Constitutional  Vitals:  Most recent vital signs, dated less than 90 days prior to this appointment, were reviewed. Pulse:  [86]   BP: (105)/(68)     General:  unremarkable, age appropriate, casual attire, good eye contact, good rapport       Musculoskeletal  Muscle Strength/Tone:  no flaccidity, no tremor    Gait & Station:  normal      Psychiatric                       Speech:  normal tone, normal rate, rhythm, and volume   Mood & Affect:   Euthymic, congruent, appropriate         Thought Process:   Goal directed; Linear    Associations:   intact   Thought Content:   No SI/HI, delusions, or paranoia, no AV/VH   Insight & Judgement:   Good, adequate to circumstances   Orientation:   grossly intact; alert and oriented x 4    Memory:  intact for content of interview    Language:  grossly intact, can repeat    Attention Span  : Grossly intact for content of interview   Fund of Knowledge:   intact and appropriate to age and level of education        Assessment and Diagnosis   Status/Progress: Based on the examination today, the patient's problem(s) is/are under fair control.  New problems have not been presented today. Comorbidities are not currently complicating management of the primary condition.      Impression:  Pt presents to OP Psychiatry for routine follow-up for treatment/medication management of LINO, MDD, Insomnia. Pt appears at ease and relieved  after her parents forced her into a rehab that she thought was for her mental health, stopped taking all meds after that and has since restarted after negative pregnancy test. Pt continues to state that she does not want mom to have access to any of her protected health information, proxy access will be deactivated. Pt states Abilify helps her tremendously and she uses the Inderal for anxiety regularly PRN for anxiety and it helps her irritability/anger as well. Pt reports sleep is good and would like to dc Remeron. Pt also stated that when she came off Paxil she got very sick (WD) and feels so much better that she is off that medicine. Pt denies SI/HI/AVH, self-harm, plan, or intent. Pt verbalizes understanding of medication instructions through teach back. No other issues, concerns, or problems, will reassess symptoms and medications in 30 days or PRN if symptoms worsen.      Diagnosis:   - LINO (generalized anxiety disorder)  - MDD (major depressive disorder), recurrent episode, mild  - Insomnia due to psychological stress (resolved)      Intervention/Counseling/Treatment Plan   Medication Management:      1. Increase ARIPiprazole (ABILIFY) 5 MG Tab - Take 1 tablet (5 mg total) by mouth once daily at bedtime for depression (adjunct). Typical LUKE's reviewed including weight gain, abnormal movements, EPS, TD, metabolic side effects.           2. Continue propranoloL (INDERAL) 20 MG tablet - Take 1 tablet (20 mg total) by mouth 3 (three) times daily as needed (anxiety).        3. Patient given contact # for psychotherapists at [facility] and also instructed she may check with insurance for list of providers.      4. Call to report any worsening of symptoms or problems with the medication. Pt instructed to go to ER with thoughts of harming self, others.         Labs: none         Return to clinic:      30 days or PRN if symptoms worsen    -Spent 30min face to face with the pt; >50% time spent in counseling    -Supportive therapy and psychoeducation provided  -R/B/SE's of medications discussed with the pt who expresses understanding and chooses to take medications as prescribed.   -Pt instructed to call clinic, 911 or go to nearest emergency room if sxs worsen or pt is in   crisis. The pt expresses understanding.      Reena Tate NP  Psychiatric-Mental Health Nurse Practitioner  Department of Psychiatry    [facility] [place] [address] [place] [address]  Phone:  [phone]  Fax: [phone]

## 2024-07-01 ENCOUNTER — CLINICAL SUPPORT (OUTPATIENT)
Dept: PSYCHIATRY | Facility: CLINIC | Age: 19
End: 2024-07-01
Payer: COMMERCIAL

## 2024-07-01 DIAGNOSIS — F33.0 MDD (MAJOR DEPRESSIVE DISORDER), RECURRENT EPISODE, MILD: ICD-10-CM

## 2024-07-01 DIAGNOSIS — F41.1 GAD (GENERALIZED ANXIETY DISORDER): Primary | ICD-10-CM

## 2024-07-01 DIAGNOSIS — F98.8 ATTENTION DEFICIT DISORDER (ADD) WITHOUT HYPERACTIVITY: ICD-10-CM

## 2024-07-01 DIAGNOSIS — F51.02 INSOMNIA DUE TO PSYCHOLOGICAL STRESS: ICD-10-CM

## 2024-07-01 PROCEDURE — 90832 PSYTX W PT 30 MINUTES: CPT | Mod: 95,,, | Performed by: COUNSELOR

## 2024-07-01 NOTE — PROGRESS NOTES
Individual Psychotherapy (PhD/LCSW)    6/28/2024    Site:  [place]       The patient location [place] is:  [place]  The patient phone number is [phone]   Visit type: Virtual visit with synchronous audio and video  Each patient to whom he or she provides medical services by telemedicine is:  (1) informed of the relationship between the physician and patient and the respective role of any other health care provider with respect to management of the patient; and (2) notified that he or she may decline to receive medical services by telemedicine and may withdraw from such care at any time.  Crisis Disclaimer: Patient was informed that due to the virtual nature of the visit, that if a crisis develops, protocols will be implemented to ensure patient safety, including but not limited to: 1) Initiating a welfare check with local Law Enforcement, 2) Calling 1/National Crisis Hotline, and/or 3) Initiating PEC/CEC procedures.    Therapeutic Intervention: Met with patient.  Outpatient - Insight oriented psychotherapy 30 min - CPT code 29145, Outpatient - Behavior modifying psychotherapy 30 min - CPT code 43159, and Outpatient - Supportive psychotherapy 30 min - CPT Code 43877    Chief complaint/reason for encounter: attention deficit, depression, mood swings, anxiety, and sleep             Interval history and content of current session: Patient presented for virtual session.  She denied SI, HI and self-harm.  Patient expressed feelings of frustration around her current mental and emotional states.  She is angry because she found out that she is not pregnant and that the inpatient facility had erred in her test results.  She was retested by her PCP and found not to be pregnant.  She is anxious and angry that she is not working and has no personal transportation.  She discussed feeling lonely and reported that no one is a friend nor does anyone truly care for her.  She reported being angry with her parents for not  understanding who she really is and her needs for personal space and autonomy.  Patient complained about ex-girlfriend following her on social media, but not being available to her in person. She stated repeatedly that she needed to know why and wanted to sign off to go ask her friend some questions about their relationship.  Provider attempted to engage patient in exploring what she needed in a healthy friendship, but asked to sign off and go question her ex.  She ended session to do so.  She will continue processing at next session.     Treatment plan:  Target symptoms: depression, distractability, lack of focus, anxiety , mood disorder  Why chosen therapy is appropriate versus another modality: relevant to diagnosis, patient responds to this modality, evidence based practice  Outcome monitoring methods: self-report, observation  Therapeutic intervention type: insight oriented psychotherapy, behavior modifying psychotherapy, supportive psychotherapy    Risk parameters:  Patient reports no suicidal ideation  Patient reports no homicidal ideation  Patient reports no self-injurious behavior  Patient reports no violent behavior    Verbal deficits: None    Patient's response to intervention:  The patient's response to intervention is accepting.    Progress toward goals and other mental status changes:  The patient's progress toward goals is poor.    Diagnosis:     ICD-10-CM ICD-9-CM   1. LINO (generalized anxiety disorder)  F41.1 300.02   2. MDD (major depressive disorder), recurrent episode, mild  F33.0 296.31   3. Insomnia due to psychological stress  F51.02 307.41   4. Attention deficit disorder (ADD) without hyperactivity  F98.8 314.00       Plan:  individual psychotherapy Pt to go to ED or call 911 if symptoms worsen or if Tamanna has thoughts of harming self and/or others. Pt verbalized understanding.    Return to clinic: 2 weeks    Length of Service (minutes): 30      Each patient to whom he or she provides  medical services by telemedicine is: (1) informed of the relationship between the physician and patient and the respective role of any other health care provider with respect to management of the patient; and (2) notified that he or she may decline to receive medical services by telemedicine and may withdraw from such care at any time.

## 2024-07-02 NOTE — PROGRESS NOTES
Individual Psychotherapy (PhD/LCSW)    7/1/2024    Site:  [place]         The [place]  The patient phone number is [phone] (Mobile)    Visit type: Virtual visit with synchronous audio and video  Each patient to whom he or she provides medical services by telemedicine is:  (1) informed of the relationship between the physician and patient and the respective role of any other health care provider with respect to management of the patient; and (2) notified that he or she may decline to receive medical services by telemedicine and may withdraw from such care at any time.  Crisis Disclaimer: Patient was informed that due to the virtual nature of the visit, that if a crisis develops, protocols will be implemented to ensure patient safety, including but not limited to: 1) Initiating a welfare check with local Law Enforcement, 2) Calling 1/National Crisis Hotline, and/or 3) Initiating PEC/CEC procedures.    Therapeutic Intervention: Met with patient.  Outpatient - Insight oriented psychotherapy 30 min - CPT code 39442, Outpatient - Behavior modifying psychotherapy 30 min - CPT code 28121, and Outpatient - Supportive psychotherapy 30 min - CPT Code 03613    Chief complaint/reason for encounter: attention deficit, depression, anxiety, and sleep             Interval history and content of current session: Patient reported for a virtual session.  Patient was in her car.  Patient reported she has found a job, but not sure if it's the one she wants. She reported that she is feeling better mentally and is getting better sleep.  She is allowed to drive her car again and she got a copy of 's license.  Patient is still concerned that she doesn't have friends and is feeling lonely.  Discussed ways to establish new friendship and  not become so attached and intimately involved so quickly.  She was again distracted by cleaning her car out and eating lunch.  She will return as scheduled.       Treatment plan:  Target symptoms: distractability, lack of focus, recurrent depression, anxiety   Why chosen therapy is appropriate versus another modality: relevant to diagnosis, patient responds to this modality, evidence based practice  Outcome monitoring methods: self-report, observation  Therapeutic intervention type: insight oriented psychotherapy, behavior modifying psychotherapy, supportive psychotherapy    Risk parameters:  Patient reports no suicidal ideation  Patient reports no homicidal ideation  Patient reports no self-injurious behavior  Patient reports no violent behavior    Verbal deficits: None    Patient's response to intervention:  The patient's response to intervention is accepting.    Progress toward goals and other mental status changes:  The patient's progress toward goals is limited.    Diagnosis:     ICD-10-CM ICD-9-CM   1. LION (generalized anxiety disorder)  F41.1 300.02   2. MDD (major depressive disorder), recurrent episode, mild  F33.0 296.31   3. Insomnia due to psychological stress  F51.02 307.41   4. Attention deficit disorder (ADD) without hyperactivity  F98.8 314.00       Plan:  individual psychotherapy Pt to go to ED or call 911 if symptoms worsen or if Tamanna has thoughts of harming self and/or others. Pt verbalized understanding.    Return to clinic: 2 weeks    Length of Service (minutes): 45      Each patient to whom he or she provides medical services by telemedicine is: (1) informed of the relationship between the physician and patient and the respective role of any other health care provider with respect to management of the patient; and (2) notified that he or she may decline to receive medical services by telemedicine and may withdraw from such care at any time.

## 2024-07-03 ENCOUNTER — OFFICE VISIT (OUTPATIENT)
Dept: PSYCHIATRY | Facility: CLINIC | Age: 19
End: 2024-07-03
Payer: COMMERCIAL

## 2024-07-03 VITALS
HEART RATE: 86 BPM | DIASTOLIC BLOOD PRESSURE: 68 MMHG | WEIGHT: 116.19 LBS | HEIGHT: 65 IN | SYSTOLIC BLOOD PRESSURE: 105 MMHG | BODY MASS INDEX: 19.36 KG/M2

## 2024-07-03 DIAGNOSIS — F41.1 GAD (GENERALIZED ANXIETY DISORDER): ICD-10-CM

## 2024-07-03 DIAGNOSIS — F33.0 MDD (MAJOR DEPRESSIVE DISORDER), RECURRENT EPISODE, MILD: Primary | ICD-10-CM

## 2024-07-03 PROBLEM — F51.02 INSOMNIA DUE TO PSYCHOLOGICAL STRESS: Status: RESOLVED | Noted: 2024-05-03 | Resolved: 2024-07-03

## 2024-07-03 PROCEDURE — 1159F MED LIST DOCD IN RCRD: CPT | Mod: CPTII,S$GLB,,

## 2024-07-03 PROCEDURE — 3074F SYST BP LT 130 MM HG: CPT | Mod: CPTII,S$GLB,,

## 2024-07-03 PROCEDURE — 3008F BODY MASS INDEX DOCD: CPT | Mod: CPTII,S$GLB,,

## 2024-07-03 PROCEDURE — 1160F RVW MEDS BY RX/DR IN RCRD: CPT | Mod: CPTII,S$GLB,,

## 2024-07-03 PROCEDURE — 99214 OFFICE O/P EST MOD 30 MIN: CPT | Mod: S$GLB,,,

## 2024-07-03 PROCEDURE — 99999 PR PBB SHADOW E&M-EST. PATIENT-LVL III: CPT | Mod: PBBFAC,,,

## 2024-07-03 PROCEDURE — 3078F DIAST BP <80 MM HG: CPT | Mod: CPTII,S$GLB,,

## 2024-07-03 RX ORDER — PROPRANOLOL HYDROCHLORIDE 20 MG/1
20 TABLET ORAL 3 TIMES DAILY
COMMUNITY
End: 2024-07-03

## 2024-07-03 RX ORDER — ARIPIPRAZOLE 2 MG/1
2 TABLET ORAL DAILY
COMMUNITY
End: 2024-07-03 | Stop reason: DRUGHIGH

## 2024-07-03 RX ORDER — ARIPIPRAZOLE 5 MG/1
5 TABLET ORAL NIGHTLY
Qty: 30 TABLET | Refills: 1 | Status: SHIPPED | OUTPATIENT
Start: 2024-07-03 | End: 2024-09-01

## 2024-07-03 RX ORDER — LORATADINE 10 MG/1
10 TABLET ORAL DAILY
COMMUNITY

## 2024-07-03 RX ORDER — PROPRANOLOL HYDROCHLORIDE 20 MG/1
20 TABLET ORAL 3 TIMES DAILY PRN
Qty: 90 TABLET | Refills: 1 | Status: SHIPPED | OUTPATIENT
Start: 2024-07-03 | End: 2024-09-01

## 2024-07-03 RX ORDER — MIRTAZAPINE 30 MG/1
30 TABLET, ORALLY DISINTEGRATING ORAL NIGHTLY
COMMUNITY
End: 2024-07-03

## 2024-07-14 ENCOUNTER — OFFICE VISIT (OUTPATIENT)
Dept: URGENT CARE | Facility: CLINIC | Age: 19
End: 2024-07-14
Payer: COMMERCIAL

## 2024-07-14 VITALS
SYSTOLIC BLOOD PRESSURE: 103 MMHG | DIASTOLIC BLOOD PRESSURE: 70 MMHG | WEIGHT: 116 LBS | BODY MASS INDEX: 19.33 KG/M2 | HEIGHT: 65 IN | OXYGEN SATURATION: 100 % | HEART RATE: 66 BPM | TEMPERATURE: 97 F | RESPIRATION RATE: 20 BRPM

## 2024-07-14 DIAGNOSIS — R53.83 FATIGUE, UNSPECIFIED TYPE: ICD-10-CM

## 2024-07-14 DIAGNOSIS — R09.81 CONGESTION OF NASAL SINUS: Primary | ICD-10-CM

## 2024-07-14 LAB
CTP QC/QA: YES
CTP QC/QA: YES
MOLECULAR STREP A: NEGATIVE
SARS-COV-2 AG RESP QL IA.RAPID: NEGATIVE

## 2024-07-14 PROCEDURE — 87651 STREP A DNA AMP PROBE: CPT | Mod: QW,S$GLB,, | Performed by: NURSE PRACTITIONER

## 2024-07-14 PROCEDURE — 99213 OFFICE O/P EST LOW 20 MIN: CPT | Mod: S$GLB,,, | Performed by: NURSE PRACTITIONER

## 2024-07-14 PROCEDURE — 87811 SARS-COV-2 COVID19 W/OPTIC: CPT | Mod: QW,S$GLB,, | Performed by: NURSE PRACTITIONER

## 2024-07-14 NOTE — PATIENT INSTRUCTIONS
Covid and Strep test negative today.  Contact Primary care doctor and Psychiatry as discussed.  Continue Zofran as needed .  If symptoms worsen go to the ER.  You must understand that you've received an Urgent Care treatment only and that you may be released before all your medical problems are known or treated. You, the patient, will arrange for follow up care as instructed.  Follow up with your PCP or specialty clinic as directed in the next 1-2 weeks if not improved or as needed.  You can call [phone] to schedule an appointment with the appropriate provider.  If your condition worsens we recommend that you receive another evaluation at the emergency room immediately or contact your primary medical clinics after hours call service to discuss your concerns.  Please return here or go to the Emergency Department for any concerns or worsening of condition.

## 2024-07-14 NOTE — PROGRESS NOTES
"Subjective:      Patient ID: Tamanna Waters is a 18 y.o. adult.    Vitals:  height is 5' 5" (1.651 m) and weight is 52.6 kg (116 lb). Tamanna's oral temperature is 97.2 °F (36.2 °C). Tamanna's blood pressure is 103/70 and Tamanna's pulse is 66. Tamanna's respiration is 20 and oxygen saturation is 100%.     Chief Complaint: Nasal Congestion    Patient states they are having congestion, body aches, headaches, and fatigue for one week. Patient states they have not taken temp. But could've had a fever.  Patient reports these symptoms have also been going on for 2-3 weeks.  Patient does report recent medication change.  Patient reports she was taken off of her Paxil in her Abilify dose was increased.  Patient reports she noticed these symptoms after the medication change.  Patient also reports intermittent nausea but does have Zofran which does improve symptoms.  Patient denies any shortness a breath or difficulty breathing.  Patient reports the symptoms come and go.  Patient has not taken OTC meds.        Constitution: Positive for fatigue. Negative for activity change, appetite change, chills, sweating, fever, unexpected weight change, generalized weakness and international travel in last 60 days.   HENT:  Positive for sore throat. Negative for ear pain, ear discharge, foreign body in ear, tinnitus, hearing loss, dental problem, drooling, mouth sores, tongue pain, tongue lesion, facial swelling, facial trauma, congestion, nosebleeds, foreign body in nose, postnasal drip, sinus pain, sinus pressure, trouble swallowing and voice change.    Respiratory:  Negative for sleep apnea, chest tightness, cough, sputum production, bloody sputum, COPD, shortness of breath, stridor, wheezing and asthma.    Allergic/Immunologic: Negative for asthma.      Objective:     Physical Exam   Constitutional: Tamanna is oriented to person, place, and time. Tamanna appears well-developed. Tamanna is cooperative.  Non-toxic appearance. " Tamanna does not appear ill. No distress.   HENT:   Head: Normocephalic and atraumatic.   Ears:   Right Ear: Hearing, tympanic membrane, external ear and ear canal normal.   Left Ear: Hearing, tympanic membrane, external ear and ear canal normal.   Nose: Nose normal. No mucosal edema, rhinorrhea or nasal deformity. No epistaxis. Right sinus exhibits no maxillary sinus tenderness and no frontal sinus tenderness. Left sinus exhibits no maxillary sinus tenderness and no frontal sinus tenderness.   Mouth/Throat: Uvula is midline, oropharynx is clear and moist and mucous membranes are normal. No trismus in the jaw. Normal dentition. No uvula swelling. Cobblestoning present. No oropharyngeal exudate, posterior oropharyngeal edema or posterior oropharyngeal erythema.   Eyes: Conjunctivae and lids are normal. No scleral icterus.   Neck: Trachea normal and phonation normal. Neck supple. No edema present. No erythema present. No neck rigidity present.   Cardiovascular: Normal rate, regular rhythm, normal heart sounds and normal pulses.   Pulmonary/Chest: Effort normal and breath sounds normal. No respiratory distress. Tamanna has no decreased breath sounds. Tamanna has no rhonchi.   Abdominal: Normal appearance.   Musculoskeletal: Normal range of motion.         General: No deformity. Normal range of motion.   Neurological: Tamanna is alert and oriented to person, place, and time. Tamanna exhibits normal muscle tone. Coordination normal.   Skin: Skin is warm, dry, intact, not diaphoretic and not pale.   Psychiatric: Naders speech is normal and behavior is normal. Judgment and thought content normal.   Nursing note and vitals reviewed.      Assessment:     1. Congestion of nasal sinus    2. Fatigue, unspecified type        Plan:       Results for orders placed or performed in visit on 07/14/24   POCT Strep A, Molecular   Result Value Ref Range    Molecular Strep A, POC Negative Negative     Acceptable Yes     SARS Coronavirus 2 Antigen, POCT Manual Read   Result Value Ref Range    SARS Coronavirus 2 Antigen Negative Negative     Acceptable Yes        Patient presents to the clinic today with intermittent fatigue and sore throat and generalized weakness.  Patient reports their symptoms started after medication changes approximately 2 weeks ago.  They report that they were taking off of their Paxil and that there Abilify dose was increased.  They report they experience intermittent fatigue as well as nausea.  Patient denies any SI/HI at this time.  They do report feeling sluggish in the mornings and not want to complete daily activities.  Patient's mother is present throughout the whole exam.  Advised to message their psychiatrist about recent medication changes and contributing to symptoms.  Testing in the clinic today is negative.  Physical exam is normal.  All questions answered.  Red flag warnings and ER precautions were reviewed.     Congestion of nasal sinus  -     POCT Strep A, Molecular  -     SARS Coronavirus 2 Antigen, POCT Manual Read    Fatigue, unspecified type      Patient Instructions   Covid and Strep test negative today.  Contact Primary care doctor and Psychiatry as discussed.  Continue Zofran as needed .  If symptoms worsen go to the ER.  You must understand that you've received an Urgent Care treatment only and that you may be released before all your medical problems are known or treated. You, the patient, will arrange for follow up care as instructed.  Follow up with your PCP or specialty clinic as directed in the next 1-2 weeks if not improved or as needed.  You can call [phone] to schedule an appointment with the appropriate provider.  If your condition worsens we recommend that you receive another evaluation at the emergency room immediately or contact your primary medical clinics after hours call service to discuss your concerns.  Please return here or go to the Emergency  Department for any concerns or worsening of condition.              Additional MDM:     Heart Failure Score:   COPD = No

## 2024-07-14 NOTE — LETTER
July 14, 2024      [facility] [place] [address], [address]  [facility] [address]  Phone: [phone]  Fax: [phone]       Patient: Tamanna Waters   YOB: 2005  Date of Visit: 07/14/2024    To Whom It May Concern:    Jesse Waters  was at [facility] on 07/14/2024. Please excuse patient for days missed. If you have any questions or concerns, or if I can be of further assistance, please do not hesitate to contact me.    Sincerely,          Juany Washburn, NP

## 2024-08-05 ENCOUNTER — OFFICE VISIT (OUTPATIENT)
Dept: PSYCHIATRY | Facility: CLINIC | Age: 19
End: 2024-08-05
Payer: COMMERCIAL

## 2024-08-05 VITALS
HEART RATE: 60 BPM | WEIGHT: 118.63 LBS | HEIGHT: 65 IN | BODY MASS INDEX: 19.76 KG/M2 | SYSTOLIC BLOOD PRESSURE: 99 MMHG | DIASTOLIC BLOOD PRESSURE: 64 MMHG

## 2024-08-05 DIAGNOSIS — F98.8 ATTENTION DEFICIT DISORDER (ADD) WITHOUT HYPERACTIVITY: Primary | ICD-10-CM

## 2024-08-05 DIAGNOSIS — F41.1 GAD (GENERALIZED ANXIETY DISORDER): ICD-10-CM

## 2024-08-05 DIAGNOSIS — F33.0 MDD (MAJOR DEPRESSIVE DISORDER), RECURRENT EPISODE, MILD: ICD-10-CM

## 2024-08-05 PROCEDURE — 99999 PR PBB SHADOW E&M-EST. PATIENT-LVL III: CPT | Mod: PBBFAC,,,

## 2024-08-05 PROCEDURE — 1159F MED LIST DOCD IN RCRD: CPT | Mod: CPTII,S$GLB,,

## 2024-08-05 PROCEDURE — 3008F BODY MASS INDEX DOCD: CPT | Mod: CPTII,S$GLB,,

## 2024-08-05 PROCEDURE — 3078F DIAST BP <80 MM HG: CPT | Mod: CPTII,S$GLB,,

## 2024-08-05 PROCEDURE — 99214 OFFICE O/P EST MOD 30 MIN: CPT | Mod: S$GLB,,,

## 2024-08-05 PROCEDURE — 3074F SYST BP LT 130 MM HG: CPT | Mod: CPTII,S$GLB,,

## 2024-08-05 PROCEDURE — 1160F RVW MEDS BY RX/DR IN RCRD: CPT | Mod: CPTII,S$GLB,,

## 2024-08-05 RX ORDER — METRONIDAZOLE 500 MG/1
500 TABLET ORAL 2 TIMES DAILY
COMMUNITY
Start: 2024-07-29

## 2024-08-05 RX ORDER — ATOMOXETINE 40 MG/1
40 CAPSULE ORAL NIGHTLY
Qty: 30 CAPSULE | Refills: 0 | Status: SHIPPED | OUTPATIENT
Start: 2024-08-05 | End: 2024-09-04

## 2024-08-07 ENCOUNTER — TELEPHONE (OUTPATIENT)
Dept: PSYCHIATRY | Facility: CLINIC | Age: 19
End: 2024-08-07
Payer: COMMERCIAL

## 2024-08-12 ENCOUNTER — PATIENT MESSAGE (OUTPATIENT)
Dept: PSYCHIATRY | Facility: CLINIC | Age: 19
End: 2024-08-12
Payer: COMMERCIAL

## 2024-08-12 DIAGNOSIS — F41.1 GAD (GENERALIZED ANXIETY DISORDER): ICD-10-CM

## 2024-08-12 RX ORDER — PROPRANOLOL HYDROCHLORIDE 20 MG/1
20 TABLET ORAL 3 TIMES DAILY PRN
Qty: 90 TABLET | Refills: 1 | Status: SHIPPED | OUTPATIENT
Start: 2024-08-12 | End: 2024-10-11

## 2024-08-13 ENCOUNTER — OFFICE VISIT (OUTPATIENT)
Dept: URGENT CARE | Facility: CLINIC | Age: 19
End: 2024-08-13
Payer: COMMERCIAL

## 2024-08-13 VITALS
OXYGEN SATURATION: 98 % | SYSTOLIC BLOOD PRESSURE: 100 MMHG | HEIGHT: 65 IN | TEMPERATURE: 98 F | WEIGHT: 118 LBS | DIASTOLIC BLOOD PRESSURE: 63 MMHG | RESPIRATION RATE: 18 BRPM | HEART RATE: 85 BPM | BODY MASS INDEX: 19.66 KG/M2

## 2024-08-13 DIAGNOSIS — S60.812A ABRASION OF LEFT WRIST, INITIAL ENCOUNTER: Primary | ICD-10-CM

## 2024-08-13 DIAGNOSIS — L23.1 CONTACT DERMATITIS DUE TO ADHESIVES, UNSPECIFIED CONTACT DERMATITIS TYPE: ICD-10-CM

## 2024-08-13 PROCEDURE — 99213 OFFICE O/P EST LOW 20 MIN: CPT | Mod: S$GLB,,, | Performed by: NURSE PRACTITIONER

## 2024-08-13 RX ORDER — TRIAMCINOLONE ACETONIDE 0.25 MG/G
CREAM TOPICAL 2 TIMES DAILY
Qty: 80 G | Refills: 0 | Status: SHIPPED | OUTPATIENT
Start: 2024-08-13 | End: 2024-08-23

## 2024-08-13 NOTE — PROGRESS NOTES
"Subjective:      Patient ID: Tamanna Waters is a 18 y.o. adult.    Vitals:  height is 5' 5" (1.651 m) and weight is 53.5 kg (118 lb). Tamanna's oral temperature is 97.7 °F (36.5 °C). Tamanna's blood pressure is 100/63 and Tamanna's pulse is 85. Tamanna's respiration is 18 and oxygen saturation is 98%.     Chief Complaint: Rash    Pt reports to clinic with rash on left wrist. Pt states that she cut herself on accident x 2 days ago while shaving with razor. Pt put mupirocin and band aid on cut. Yesterday developed a rash that burns in area where adhesive was. Pain rated 4/10.    Rash  This is a new problem. The current episode started in the past 7 days. The problem is unchanged. The affected locations include the left wrist. The rash is characterized by burning and redness. Tamanna was exposed to nothing. Past treatments include antibiotic cream. The treatment provided no relief.       Skin:  Positive for rash. Negative for erythema.      Objective:     Physical Exam   Constitutional: Tamanna is oriented to person, place, and time. Tamanna appears well-developed.   HENT:   Head: Normocephalic and atraumatic. Head is without abrasion, without contusion and without laceration.   Ears:   Right Ear: External ear normal.   Left Ear: External ear normal.   Nose: Nose normal.   Mouth/Throat: Oropharynx is clear and moist and mucous membranes are normal.   Eyes: Conjunctivae, EOM and lids are normal. Pupils are equal, round, and reactive to light.   Neck: Trachea normal and phonation normal. Neck supple.   Cardiovascular: Normal rate, regular rhythm and normal heart sounds.   Pulmonary/Chest: Effort normal and breath sounds normal. No stridor. No respiratory distress.   Musculoskeletal: Normal range of motion.         General: Normal range of motion.   Neurological: Tamanna is alert and oriented to person, place, and time.   Skin: Skin is warm, dry, intact and rash (Erythematous maculopapular rash following a " distribution from a previous bandage.  Well-healing abrasion noted to middle aspect of wound). Capillary refill takes less than 2 seconds. No abrasion, No burn, No bruising, No erythema and No ecchymosis   Psychiatric: Tamanna's speech is normal and behavior is normal. Judgment and thought content normal.   Nursing note and vitals reviewed.      Assessment:     1. Abrasion of left wrist, initial encounter    2. Contact dermatitis due to adhesives, unspecified contact dermatitis type        Plan:       Abrasion of left wrist, initial encounter    Contact dermatitis due to adhesives, unspecified contact dermatitis type  -     triamcinolone acetonide 0.025% (KENALOG) 0.025 % cream; Apply topically 2 (two) times daily. for 10 days  Dispense: 80 g; Refill: 0        Patient Instructions   Cleanse with antibacterial soap.  Topical steroid as needed.  Continue bactroban.  You must understand that you've received an Urgent Care treatment only and that you may be released before all your medical problems are known or treated. You, the patient, will arrange for follow up care as instructed.  Follow up with your PCP or specialty clinic as directed in the next 1-2 weeks if not improved or as needed.  You can call [phone] to schedule an appointment with the appropriate provider.  If your condition worsens we recommend that you receive another evaluation at the emergency room immediately or contact your primary medical clinics after hours call service to discuss your concerns.  Please return here or go to the Emergency Department for any concerns or worsening of condition. '

## 2024-08-13 NOTE — PATIENT INSTRUCTIONS
Cleanse with antibacterial soap.  Topical steroid as needed.  Continue bactroban.  You must understand that you've received an Urgent Care treatment only and that you may be released before all your medical problems are known or treated. You, the patient, will arrange for follow up care as instructed.  Follow up with your PCP or specialty clinic as directed in the next 1-2 weeks if not improved or as needed.  You can call [phone] to schedule an appointment with the appropriate provider.  If your condition worsens we recommend that you receive another evaluation at the emergency room immediately or contact your primary medical clinics after hours call service to discuss your concerns.  Please return here or go to the Emergency Department for any concerns or worsening of condition. '

## 2024-08-30 ENCOUNTER — PATIENT MESSAGE (OUTPATIENT)
Dept: PSYCHIATRY | Facility: CLINIC | Age: 19
End: 2024-08-30
Payer: COMMERCIAL

## 2024-09-17 DIAGNOSIS — F41.1 GAD (GENERALIZED ANXIETY DISORDER): ICD-10-CM

## 2024-09-17 DIAGNOSIS — F33.0 MDD (MAJOR DEPRESSIVE DISORDER), RECURRENT EPISODE, MILD: Primary | ICD-10-CM

## 2024-09-18 ENCOUNTER — CLINICAL SUPPORT (OUTPATIENT)
Dept: PSYCHIATRY | Facility: CLINIC | Age: 19
End: 2024-09-18
Payer: COMMERCIAL

## 2024-09-18 DIAGNOSIS — F98.8 ATTENTION DEFICIT DISORDER (ADD) WITHOUT HYPERACTIVITY: Primary | ICD-10-CM

## 2024-09-18 DIAGNOSIS — F33.0 MDD (MAJOR DEPRESSIVE DISORDER), RECURRENT EPISODE, MILD: ICD-10-CM

## 2024-09-18 DIAGNOSIS — F41.1 GAD (GENERALIZED ANXIETY DISORDER): ICD-10-CM

## 2024-09-18 DIAGNOSIS — F51.02 INSOMNIA DUE TO PSYCHOLOGICAL STRESS: ICD-10-CM

## 2024-09-18 PROCEDURE — 90837 PSYTX W PT 60 MINUTES: CPT | Mod: 95,,, | Performed by: COUNSELOR

## 2024-09-19 NOTE — PROGRESS NOTES
Individual Psychotherapy (PhD/LCSW)    9/18/2024    Site:  [place]       The [place] location [place] is:  [place]  The patient phone number is  [phone]   Visit type: Virtual visit with synchronous audio and video  Each patient to whom he or she provides medical services by telemedicine is:  (1) informed of the relationship between the physician and patient and the respective role of any other health care provider with respect to management of the patient; and (2) notified that he or she may decline to receive medical services by telemedicine and may withdraw from such care at any time.  Crisis Disclaimer: Patient was informed that due to the virtual nature of the visit, that if a crisis develops, protocols will be implemented to ensure patient safety, including but not limited to: 1) Initiating a welfare check with local Law Enforcement, 2) Calling 1/National Crisis Hotline, and/or 3) Initiating PEC/CEC procedures.    Therapeutic Intervention: Met with patient.  Outpatient - Insight oriented psychotherapy 60 min - CPT code 58715, Outpatient - Behavior modifying psychotherapy 60 min - CPT code 33147, and Outpatient - Supportive psychotherapy 60 min - CPT Code 07289    Chief complaint/reason for encounter: attention deficit, depression, anxiety, and sleep             Interval history and content of current session:  Patient presented for virtual session.  Patient denied SI, HI or self-harm.  Patient reports experiencing some depression around the fact that she feels stuck in her life.  Patient is working and going to school which takes up a large portion of her.  Processed with patient what stuck meant  for her and explored ways to make movement in her life.  Patient reported feeling lonely since she is currently not in an intimate relationship.  Patient's sleep is fair and her appetite is poor.  Patient has discussed medication changes with her psychiatrist.  Patient will return as scheduled.       Treatment plan:  Target symptoms: depression, distractability, lack of focus, anxiety , mood swings  Why chosen therapy is appropriate versus another modality: relevant to diagnosis, patient responds to this modality, evidence based practice  Outcome monitoring methods: self-report, observation, feedback from family  Therapeutic intervention type: insight oriented psychotherapy, behavior modifying psychotherapy, supportive psychotherapy    Risk parameters:  Patient reports no suicidal ideation  Patient reports no homicidal ideation  Patient reports no self-injurious behavior  Patient reports no violent behavior    Verbal deficits: None    Patient's response to intervention:  The patient's response to intervention is accepting.    Progress toward goals and other mental status changes:  The patient's progress toward goals is limited.    Diagnosis:     ICD-10-CM ICD-9-CM   1. Attention deficit disorder (ADD) without hyperactivity  F98.8 314.00   2. MDD (major depressive disorder), recurrent episode, mild  F33.0 296.31   3. LINO (generalized anxiety disorder)  F41.1 300.02   4. Insomnia due to psychological stress  F51.02 307.41       Plan:  individual psychotherapy Pt to go to ED or call 911 if symptoms worsen or if Tamanna has thoughts of harming self and/or others. Pt verbalized understanding.    Return to clinic: as scheduled    Length of Service (minutes): 60      Each patient to whom he or she provides medical services by telemedicine is: (1) informed of the relationship between the physician and patient and the respective role of any other health care provider with respect to management of the patient; and (2) notified that he or she may decline to receive medical services by telemedicine and may withdraw from such care at any time.

## 2024-09-23 ENCOUNTER — TELEPHONE (OUTPATIENT)
Dept: PSYCHIATRY | Facility: CLINIC | Age: 19
End: 2024-09-23
Payer: COMMERCIAL

## 2024-09-23 DIAGNOSIS — F33.0 MDD (MAJOR DEPRESSIVE DISORDER), RECURRENT EPISODE, MILD: ICD-10-CM

## 2024-09-23 NOTE — TELEPHONE ENCOUNTER
Spoke to patient regarding rescheduling her appt tomorrow. She was not able to make it to any other available appt this week so we cancelled the appt and she will return to next scheduled appt on Mon @ 11am. No further questions or concerns at this time.

## 2024-09-24 RX ORDER — ARIPIPRAZOLE 5 MG/1
5 TABLET ORAL NIGHTLY
Qty: 30 TABLET | Refills: 0 | Status: SHIPPED | OUTPATIENT
Start: 2024-09-24 | End: 2024-10-24

## 2024-09-24 NOTE — TELEPHONE ENCOUNTER
LOV: 08/05/2024  NOV: Return to clinic:        30 days or PRN if symptoms worsen   LDO: 07/03/2024

## 2024-09-30 ENCOUNTER — CLINICAL SUPPORT (OUTPATIENT)
Dept: PSYCHIATRY | Facility: CLINIC | Age: 19
End: 2024-09-30
Payer: COMMERCIAL

## 2024-09-30 DIAGNOSIS — F33.0 MDD (MAJOR DEPRESSIVE DISORDER), RECURRENT EPISODE, MILD: Primary | ICD-10-CM

## 2024-09-30 DIAGNOSIS — F41.1 GAD (GENERALIZED ANXIETY DISORDER): ICD-10-CM

## 2024-09-30 PROCEDURE — 90832 PSYTX W PT 30 MINUTES: CPT | Mod: 95,,, | Performed by: COUNSELOR

## 2024-09-30 NOTE — PROGRESS NOTES
Individual Psychotherapy (PhD/LCSW)    9/30/2024    Site:  [place]       The patient location [place]/City is:  [place]  The patient phone number is [phone]   Visit type: Virtual visit with synchronous audio and video  Each patient to whom he or she provides medical services by telemedicine is:  (1) informed of the relationship between the physician and patient and the respective role of any other health care provider with respect to management of the patient; and (2) notified that he or she may decline to receive medical services by telemedicine and may withdraw from such care at any time.  Crisis Disclaimer: Patient was informed that due to the virtual nature of the visit, that if a crisis develops, protocols will be implemented to ensure patient safety, including but not limited to: 1) Initiating a welfare check with local Law Enforcement, 2) Calling 1/National Crisis Hotline, and/or 3) Initiating PEC/CEC procedures.      Therapeutic Intervention: Met with patient.  Outpatient - Insight oriented psychotherapy 30 min - CPT code 49399, Outpatient - Behavior modifying psychotherapy 30 min - CPT code 81800, and Outpatient - Supportive psychotherapy 30 min - CPT Code 51492    Chief complaint/reason for encounter: depression and anxiety             Interval history and content of current session: Patient reported for virtual visit.  Patient denied SI, HI and self-harm.  Patient reported feeling frustrated around a failed attempt to establish a new relationship.  Patient reports feeling lonely and disconnected from most people.  She has trouble finding pleasure in work, school, and most social interactions.   Patient's appetite and  sleep are fair.  Patient denied recent use of THC. Explored ways to reduce her anxiety and to establish a healthier self-care regime. Patient will return as scheduled.     Treatment plan:  Target symptoms: depression, anxiety   Why chosen therapy is appropriate versus another modality:  relevant to diagnosis, patient responds to this modality, evidence based practice  Outcome monitoring methods: self-report, observation  Therapeutic intervention type: insight oriented psychotherapy, behavior modifying psychotherapy, supportive psychotherapy    Risk parameters:  Patient reports no suicidal ideation  Patient reports no homicidal ideation  Patient reports no self-injurious behavior  Patient reports no violent behavior    Verbal deficits: None    Patient's response to intervention:  The patient's response to intervention is accepting.    Progress toward goals and other mental status changes:  The patient's progress toward goals is fair .    Diagnosis:   MDD F33    Plan:  individual psychotherapy Pt to go to ED or call 911 if symptoms worsen or if Tamanna has thoughts of harming self and/or others. Pt verbalized understanding.    Return to clinic: as scheduled    Length of Service (minutes): 30      Each patient to whom he or she provides medical services by telemedicine is: (1) informed of the relationship between the physician and patient and the respective role of any other health care provider with respect to management of the patient; and (2) notified that he or she may decline to receive medical services by telemedicine and may withdraw from such care at any time.

## 2024-10-01 ENCOUNTER — OFFICE VISIT (OUTPATIENT)
Dept: PSYCHIATRY | Facility: CLINIC | Age: 19
End: 2024-10-01
Payer: COMMERCIAL

## 2024-10-01 VITALS
SYSTOLIC BLOOD PRESSURE: 105 MMHG | HEART RATE: 72 BPM | DIASTOLIC BLOOD PRESSURE: 67 MMHG | HEIGHT: 65 IN | BODY MASS INDEX: 19.65 KG/M2 | WEIGHT: 117.94 LBS

## 2024-10-01 DIAGNOSIS — F17.200 NICOTINE DEPENDENCE DUE TO VAPING NON-TOBACCO PRODUCT: ICD-10-CM

## 2024-10-01 DIAGNOSIS — F12.20 CANNABIS USE DISORDER, SEVERE, DEPENDENCE: Primary | ICD-10-CM

## 2024-10-01 DIAGNOSIS — F41.1 GAD (GENERALIZED ANXIETY DISORDER): ICD-10-CM

## 2024-10-01 PROBLEM — F12.90 CANNABIS USE DISORDER: Status: ACTIVE | Noted: 2024-10-01

## 2024-10-01 PROCEDURE — 99999 PR PBB SHADOW E&M-EST. PATIENT-LVL III: CPT | Mod: PBBFAC,,, | Performed by: NEUROMUSCULOSKELETAL MEDICINE & OMM

## 2024-10-01 NOTE — PATIENT INSTRUCTIONS
Follow up as needed either virtual or in person. I'd be happy to discuss treatment options with you when you are ready. Contact the clinic with any concerns. Review the following handouts that were provided during your encounter: Education regarding addiction and dependency and Education regarding risks of using Marijuana and Tobacco/Nicotine. If you feel the concerns are of a very serious (example: seizure, suicidal thoughts, hallucinations, severe illness) call 911 or go to the nearest emergency department.
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
## 2024-10-07 ENCOUNTER — PATIENT MESSAGE (OUTPATIENT)
Dept: PSYCHIATRY | Facility: CLINIC | Age: 19
End: 2024-10-07
Payer: COMMERCIAL

## 2024-10-08 ENCOUNTER — TELEPHONE (OUTPATIENT)
Dept: PSYCHIATRY | Facility: CLINIC | Age: 19
End: 2024-10-08
Payer: COMMERCIAL

## 2024-10-08 ENCOUNTER — PATIENT MESSAGE (OUTPATIENT)
Dept: PSYCHIATRY | Facility: CLINIC | Age: 19
End: 2024-10-08
Payer: COMMERCIAL

## 2024-10-08 NOTE — TELEPHONE ENCOUNTER
Patient is officially dismissed from Reena Tate  care. Last date she can be seen by provider or get refills is 10/11/2024
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
## 2024-10-09 ENCOUNTER — OFFICE VISIT (OUTPATIENT)
Dept: PSYCHIATRY | Facility: CLINIC | Age: 19
End: 2024-10-09
Payer: COMMERCIAL

## 2024-10-09 VITALS
WEIGHT: 116.06 LBS | SYSTOLIC BLOOD PRESSURE: 112 MMHG | HEART RATE: 90 BPM | HEIGHT: 65 IN | DIASTOLIC BLOOD PRESSURE: 76 MMHG | BODY MASS INDEX: 19.34 KG/M2

## 2024-10-09 DIAGNOSIS — F12.20 CANNABIS USE DISORDER, SEVERE, DEPENDENCE: ICD-10-CM

## 2024-10-09 DIAGNOSIS — F39 MOOD DISORDER: Primary | ICD-10-CM

## 2024-10-09 DIAGNOSIS — F41.9 ANXIETY: ICD-10-CM

## 2024-10-09 PROCEDURE — 3078F DIAST BP <80 MM HG: CPT | Mod: CPTII,S$GLB,, | Performed by: PSYCHOLOGIST

## 2024-10-09 PROCEDURE — 99999 PR PBB SHADOW E&M-EST. PATIENT-LVL III: CPT | Mod: PBBFAC,,, | Performed by: PSYCHOLOGIST

## 2024-10-09 PROCEDURE — 3074F SYST BP LT 130 MM HG: CPT | Mod: CPTII,S$GLB,, | Performed by: PSYCHOLOGIST

## 2024-10-09 PROCEDURE — 90792 PSYCH DIAG EVAL W/MED SRVCS: CPT | Mod: S$GLB,,, | Performed by: PSYCHOLOGIST

## 2024-10-09 RX ORDER — ONDANSETRON 4 MG/1
8 TABLET, FILM COATED ORAL 2 TIMES DAILY
COMMUNITY

## 2024-10-16 ENCOUNTER — CLINICAL SUPPORT (OUTPATIENT)
Dept: PSYCHIATRY | Facility: CLINIC | Age: 19
End: 2024-10-16
Payer: COMMERCIAL

## 2024-10-16 DIAGNOSIS — F98.8 ATTENTION DEFICIT DISORDER (ADD) WITHOUT HYPERACTIVITY: ICD-10-CM

## 2024-10-16 DIAGNOSIS — F12.20 CANNABIS USE DISORDER, SEVERE, DEPENDENCE: Primary | ICD-10-CM

## 2024-10-16 DIAGNOSIS — F41.1 GAD (GENERALIZED ANXIETY DISORDER): ICD-10-CM

## 2024-10-16 DIAGNOSIS — F33.0 MDD (MAJOR DEPRESSIVE DISORDER), RECURRENT EPISODE, MILD: ICD-10-CM

## 2024-10-16 DIAGNOSIS — F51.02 INSOMNIA DUE TO PSYCHOLOGICAL STRESS: ICD-10-CM

## 2024-10-16 DIAGNOSIS — F17.200 NICOTINE DEPENDENCE DUE TO VAPING NON-TOBACCO PRODUCT: ICD-10-CM

## 2024-10-16 DIAGNOSIS — F41.9 ANXIETY: ICD-10-CM

## 2024-10-16 PROCEDURE — 90837 PSYTX W PT 60 MINUTES: CPT | Mod: 95,,, | Performed by: COUNSELOR

## 2024-10-16 NOTE — PROGRESS NOTES
Individual Psychotherapy (PhD/LCSW)    10/16/2024    Site:  [place]       The patient location [place] is:  [place]  The patient phone number is  [phone]   Visit type: Virtual visit with synchronous audio and video  Each patient to whom he or she provides medical services by telemedicine is:  (1) informed of the relationship between the physician and patient and the respective role of any other health care provider with respect to management of the patient; and (2) notified that he or she may decline to receive medical services by telemedicine and may withdraw from such care at any time.  Crisis Disclaimer: Patient was informed that due to the virtual nature of the visit, that if a crisis develops, protocols will be implemented to ensure patient safety, including but not limited to: 1) Initiating a welfare check with local Law Enforcement, 2) Calling 1/National Crisis Hotline, and/or 3) Initiating PEC/CEC procedures.    Therapeutic Intervention: Met with patient.  Outpatient - Insight oriented psychotherapy 60 min - CPT code 48114, Outpatient - Behavior modifying psychotherapy 60 min - CPT code 92913, and Outpatient - Supportive psychotherapy 60 min - CPT Code 50544    Chief complaint/reason for encounter: attention deficit, depression, mood swings, anxiety, and sleep             Interval history and content of current session: Patient presented for virtual session.  She reported to being very depressed because she isn't having sex and that she thinks about it all day and night.  Patient is working and attending school, but has lost motivation and pleasure in both.  Patient is despondent about not having a partner at this time.  She ruminated on past relationships. Explored CBT/DBT strategies that could possibly help alleviate some her negative narratives.  Patient does believe anything can help at this time except smoking THC and sex.  Encouraged patient to consider taking action and accountability for  her mood as medications are only one aid in the process. Sleep is poor; appetite is poor. Denied SI, HI and self-harm.  No delusions, paranoia or hallucination.  Reported having disturbing dreams. Patient will follow up with psychiatrist concerning medications.  She will return as scheduled.     Treatment plan:  Target symptoms: depression, distractability, lack of focus, anxiety , substance abuse  Why chosen therapy is appropriate versus another modality: relevant to diagnosis, patient responds to this modality, evidence based practice  Outcome monitoring methods: self-report, observation  Therapeutic intervention type: insight oriented psychotherapy, behavior modifying psychotherapy, supportive psychotherapy    Risk parameters:  Patient reports no suicidal ideation  Patient reports no homicidal ideation  Patient reports no self-injurious behavior  Patient reports no violent behavior    Verbal deficits: None    Patient's response to intervention:  The patient's response to intervention is accepting.    Progress toward goals and other mental status changes:  The patient's progress toward goals is poor.    Diagnosis:     ICD-10-CM ICD-9-CM   1. Cannabis use disorder, severe, dependence  F12.20 304.30   2. Nicotine dependence due to vaping non-tobacco product  F17.200 305.1   3. LINO (generalized anxiety disorder)  F41.1 300.02   4. MDD (major depressive disorder), recurrent episode, mild  F33.0 296.31   5. Insomnia due to psychological stress  F51.02 307.41   6. Anxiety  F41.9 300.00   7. Attention deficit disorder (ADD) without hyperactivity  F98.8 314.00       Plan:  individual psychotherapy Pt to go to ED or call 911 if symptoms worsen or if Tamanna has thoughts of harming self and/or others. Pt verbalized understanding.    Return to clinic: 1 week    Length of Service (minutes): 60      Each patient to whom he or she provides medical services by telemedicine is: (1) informed of the relationship between the  physician and patient and the respective role of any other health care provider with respect to management of the patient; and (2) notified that he or she may decline to receive medical services by telemedicine and may withdraw from such care at any time.

## 2024-10-22 DIAGNOSIS — F33.0 MDD (MAJOR DEPRESSIVE DISORDER), RECURRENT EPISODE, MILD: ICD-10-CM

## 2024-10-22 RX ORDER — ARIPIPRAZOLE 5 MG/1
5 TABLET ORAL NIGHTLY
Qty: 30 TABLET | Refills: 0 | OUTPATIENT
Start: 2024-10-22

## 2024-10-23 ENCOUNTER — CLINICAL SUPPORT (OUTPATIENT)
Dept: PSYCHIATRY | Facility: CLINIC | Age: 19
End: 2024-10-23
Payer: COMMERCIAL

## 2024-10-23 DIAGNOSIS — F12.20 CANNABIS USE DISORDER, SEVERE, DEPENDENCE: ICD-10-CM

## 2024-10-23 DIAGNOSIS — F51.02 INSOMNIA DUE TO PSYCHOLOGICAL STRESS: ICD-10-CM

## 2024-10-23 DIAGNOSIS — F41.1 GAD (GENERALIZED ANXIETY DISORDER): ICD-10-CM

## 2024-10-23 DIAGNOSIS — F33.0 MDD (MAJOR DEPRESSIVE DISORDER), RECURRENT EPISODE, MILD: Primary | ICD-10-CM

## 2024-10-23 PROCEDURE — 90837 PSYTX W PT 60 MINUTES: CPT | Mod: 95,,, | Performed by: COUNSELOR

## 2024-10-24 NOTE — PROGRESS NOTES
Individual Psychotherapy (PhD/LCSW)    10/23/2024    Site:  [place]         The [place] location [place] is:  [place]  The patient phone number is [phone]   Visit type: Virtual visit with synchronous audio and video  Each patient to whom he or she provides medical services by telemedicine is:  (1) informed of the relationship between the physician and patient and the respective role of any other health care provider with respect to management of the patient; and (2) notified that he or she may decline to receive medical services by telemedicine and may withdraw from such care at any time.  Crisis Disclaimer: Patient was informed that due to the virtual nature of the visit, that if a crisis develops, protocols will be implemented to ensure patient safety, including but not limited to: 1) Initiating a welfare check with local Law Enforcement, 2) Calling 1/National Crisis Hotline, and/or 3) Initiating PEC/CEC procedures.  Therapeutic Intervention: Met with patient.  Outpatient - Insight oriented psychotherapy 60 min - CPT code 05306, Outpatient - Behavior modifying psychotherapy 60 min - CPT code 52302, and Outpatient - Supportive psychotherapy 60 min - CPT Code 94600    Chief complaint/reason for encounter: addictive disorder(THC), MDD, mood swings, anxiety, and poor sleep             Interval history and content of current session: Patient presented for virtual session. Patient reported that she has quit her job because it was proving to be too stressful and that her parents agreed to support her until found another job.  She remains in school, but complained that it is boring and not sure what she wants anymore. She stated that she has experienced increased paranoia believing that something bad will happen to her.  Explored with patient rather or not any recent  events added to her fear--looked for evidence of potential danger.   Patient admitted that nothing in her environment at home or school puts her  "in harm's way. She has stop taking all medications because she believed that they were not helpful, but continues to smoke THC .  She reported that she had a meltdown against her parents, but realized that she was just feeling emotionally overwhelmed and that things were not their fault.  Patient continues to ruminate over past relationships and feels hopeless of ever finding "love".  She stated that her last relationship "took her very soul".  Explored with patient how her intense, unstable emotions may contribute to failed relationships.  Patient idealizes the person while in the relationship,  but expresses extreme disappointment and anger once it ends. Patient has chronic feelings of emptiness, numbness and disconnection. Attempted to focus on emotional regulation and distress tolerance strategies, but patient negated every suggestion and refuses to restart medication at this time. Suggested that patient work on a safety plan for moments of crisis that included contacting trusted friends and family,  coping activities, and mental health facility resources. Patient will return as scheduled.  She denied suicidal and homicidal ideation as well as self-harming actions.     Treatment plan:  Target symptoms: recurrent depression, anxiety , substance abuse, mood swings  Why chosen therapy is appropriate versus another modality: relevant to diagnosis, patient responds to this modality, evidence based practice  Outcome monitoring methods: self-report, observation  Therapeutic intervention type: insight oriented psychotherapy, behavior modifying psychotherapy, supportive psychotherapy    Risk parameters:  Patient reports no suicidal ideation  Patient reports no homicidal ideation  Patient reports no self-injurious behavior  Patient reports no violent behavior    Verbal deficits: None    Patient's response to intervention:  The patient's response to intervention is reluctant.    Progress toward goals and other mental " status changes:  The patient's progress toward goals is not progressing.    Diagnosis:     ICD-10-CM ICD-9-CM   1. MDD (major depressive disorder), recurrent episode, mild  F33.0 296.31   2. LINO (generalized anxiety disorder)  F41.1 300.02   3. Cannabis use disorder, severe, dependence  F12.20 304.30   4. Insomnia due to psychological stress  F51.02 307.41       Plan:  individual psychotherapy Pt to go to ED or call 911 if symptoms worsen or if Tamanna has thoughts of harming self and/or others. Pt verbalized understanding.    Return to clinic: as scheduled    Length of Service (minutes): 60      Each patient to whom he or she provides medical services by telemedicine is: (1) informed of the relationship between the physician and patient and the respective role of any other health care provider with respect to management of the patient; and (2) notified that he or she may decline to receive medical services by telemedicine and may withdraw from such care at any time.

## 2024-10-28 ENCOUNTER — CLINICAL SUPPORT (OUTPATIENT)
Dept: PSYCHIATRY | Facility: CLINIC | Age: 19
End: 2024-10-28
Payer: COMMERCIAL

## 2024-10-28 DIAGNOSIS — F41.1 GAD (GENERALIZED ANXIETY DISORDER): ICD-10-CM

## 2024-10-28 DIAGNOSIS — F98.8 ATTENTION DEFICIT DISORDER (ADD) WITHOUT HYPERACTIVITY: ICD-10-CM

## 2024-10-28 DIAGNOSIS — F33.0 MDD (MAJOR DEPRESSIVE DISORDER), RECURRENT EPISODE, MILD: Primary | ICD-10-CM

## 2024-10-28 DIAGNOSIS — F12.20 CANNABIS USE DISORDER, SEVERE, DEPENDENCE: ICD-10-CM

## 2024-10-28 DIAGNOSIS — F51.02 INSOMNIA DUE TO PSYCHOLOGICAL STRESS: ICD-10-CM

## 2024-10-28 PROCEDURE — 90837 PSYTX W PT 60 MINUTES: CPT | Mod: 95,,, | Performed by: COUNSELOR

## 2024-11-13 ENCOUNTER — OFFICE VISIT (OUTPATIENT)
Dept: PSYCHIATRY | Facility: CLINIC | Age: 19
End: 2024-11-13
Payer: COMMERCIAL

## 2024-11-13 DIAGNOSIS — F41.9 ANXIETY: ICD-10-CM

## 2024-11-13 DIAGNOSIS — F39 MOOD DISORDER: Primary | ICD-10-CM

## 2024-11-13 DIAGNOSIS — F12.20 CANNABIS USE DISORDER, SEVERE, DEPENDENCE: ICD-10-CM

## 2024-11-13 PROCEDURE — 1159F MED LIST DOCD IN RCRD: CPT | Mod: CPTII,95,, | Performed by: PSYCHOLOGIST

## 2024-11-13 PROCEDURE — 90833 PSYTX W PT W E/M 30 MIN: CPT | Mod: 95,,, | Performed by: PSYCHOLOGIST

## 2024-11-13 PROCEDURE — 99214 OFFICE O/P EST MOD 30 MIN: CPT | Mod: 95,,, | Performed by: PSYCHOLOGIST

## 2024-11-13 NOTE — PROGRESS NOTES
The patient location is:  [place], [place]  The patient location [place] is: [place]  The patient phone number is: [phone]  Visit type: Virtual visit with synchronous audio and video  Each patient to whom he or she provides medical services by telemedicine is:  (1) informed of the relationship between the physician and patient and the respective role of any other health care provider with respect to management of the patient; and (2) notified that he or she may decline to receive medical services by telemedicine and may withdraw from such care at any time.     Outpatient Psychiatry Follow-Up Visit    Clinical Status of Patient: Outpatient (Ambulatory)  11/13/2024     Chief Complaint:  presenting today for a follow-up.       Interval History and Content of Current Session:  Interim Events/Subjective Report/Content of Current Session:  follow-up appointment.    Pt is a 17 y/o female with past psychiatric hx of mood disorder, anxiety, cannabis abuse who presents for follow-up treatment. Pt reported that she quit her job due to stress. Wants to focus on school. Stated that she has stopped taking the Abilify. Noted some irritability initially but stated that this has subsided. Stated that she continues to smoke cannabis every day. Stated that it helps with back pain and anxiety. Continues to vomit from anxiety about 3x per week. Some anxiety while driving. Listening to music helps. Pt stated that she is not interested in any medication management and wants to focus on therapy.     Past Psychiatric hx: Aripiprazole, mirtazapine, paroxetine, propranolol, THC Rx, Adderall, trazodone, atomoxetine (Strattera)    Past Medical hx:   Past Medical History:   Diagnosis Date    Acne 9 years old    Facial trauma Acne?? Very dry skin, dry eyes    Hearing loss My fault    Iron deficiency anemia due to chronic blood loss 11/01/2022    Jaundice     birth    Mental disorder 17    ADD, im being treated for borderline personality  disorder, ocd, adhd, pmdd, etc    Seasonal allergies Born    Seizures Around 9, only arpund sunlight flickering        Interim hx:  Medication changes last visit:     Anxiety: moderate to severe  Depression: mild     Denies suicidal/homicidal ideations.  Denies hopelessness/worthlessness.    Denies auditory/visual hallucinations      Alcohol: Infrequent use  Drug: Pt denies  Tobacco: Pt denies      Review of Systems   PSYCHIATRIC: Pertinent items are noted in the narrative.        CONSTITUTIONAL: weight stable    Past Medical, Family and Social History: The patient's past medical, family and social history have been reviewed and updated as appropriate within the electronic medical record. See encounter notes.     Current Psychiatric Medication:  Pt denied     Compliance: limited     Side effects: Pt denies     Risk Parameters:  Patient reports no suicidal ideation  Patient reports no homicidal ideation  Patient reports no self-injurious behavior  Patient reports no violent behavior     Exam (detailed: at least 9 elements; comprehensive: all 15 elements)   Constitutional  Vitals:  Most recent vital signs, dated less than 90 days prior to this appointment, were reviewed.        General:  unremarkable, age appropriate, casual attire, good eye contact, good rapport       Musculoskeletal  Muscle Strength/Tone:  no flaccidity, no tremor    Gait & Station:  normal      Psychiatric                       Speech:  normal tone, normal rate, rhythm, and volume   Mood & Affect:   Mildly depressed, anxious         Thought Process:   Goal directed; Linear    Associations:   intact   Thought Content:   No SI/HI, delusions, or paranoia, no AV/VH   Insight & Judgement:   Good, adequate to circumstances   Orientation:   grossly intact; alert and oriented x 4    Memory:  intact for content of interview    Language:  grossly intact, can repeat    Attention Span  : Grossly intact for content of interview   Fund of Knowledge:   intact  and appropriate to age and level of education        Assessment and Diagnosis   Status/Progress: Based on the examination today, the patient's problem(s) is/are adequately controlled.  New problems have not been presented today. Co-morbidities are not complicating management of the primary condition. There are no active rule-out diagnoses for this patient at this time.      Impression: Pt appears to have some mild mood symptoms and anxiety. Continues to use cannabis daily, which is likely contributing to these symptoms. MI techniques to induce change talk and psychoeducation provided. Pt has refused medication management and will focus on therapy. Assured pt that she can return in the future if needed.     Diagnosis:   1) Mood disorder  2) Anxiety Disorder  3) Cannabis use disorder  Intervention/Counseling/Treatment Plan   Medication Management:      1. Cont in therapy with Alexandria Castillo LPC    Psychotherapy: 20 minutes  Target symptoms: cannabis use  Why chosen therapy is appropriate versus another modality: CBT used; relevant to diagnosis, patient responds to this modality  Outcome monitoring methods: self-report, observation  Therapeutic intervention type: MI techniques  Topics discussed/themes: building skills sets for symptom management, symptom recognition, nutrition, exercise  The patient's response to the intervention is accepting  Patient's response to treatment is: good.   The patient's progress toward treatment goals: improving     Return to clinic: as needed    -Cognitive-Behavioral/Supportive therapy and psychoeducation provided  -R/B/SE's of medications discussed with the pt who expresses understanding and chooses to take medications as prescribed.   -Pt instructed to call clinic, 911 or go to nearest emergency room if sxs worsen or pt is in   crisis. The pt expresses understanding.    Richi Francisco, PhD, MP

## 2024-11-29 NOTE — TELEPHONE ENCOUNTER
Called pt to offer 30 min screening with Dr. Dorsey on 4/18/24 at 1 pm. Patient has a doctor's appointment at that time. Told patient we will send Dr. Dorsey a message to see if he would be able to give patient a phone call instead on 4/18/24 around 8 am. Patient is eager to start group therapy on next Tuesday. Patient stated group hours will be appropriate for her. Patient expecting a call back on tomorrow with provider's response.    0

## 2024-12-09 ENCOUNTER — PATIENT MESSAGE (OUTPATIENT)
Dept: PSYCHIATRY | Facility: CLINIC | Age: 19
End: 2024-12-09
Payer: COMMERCIAL

## 2024-12-09 NOTE — TELEPHONE ENCOUNTER
Please let me know when you would like her to be scheduled?  Today in [place]? Tomorrow virtual or Wednesday in [place]?

## 2024-12-11 ENCOUNTER — TELEPHONE (OUTPATIENT)
Dept: PSYCHIATRY | Facility: CLINIC | Age: 19
End: 2024-12-11
Payer: COMMERCIAL

## 2024-12-11 ENCOUNTER — OFFICE VISIT (OUTPATIENT)
Dept: PSYCHIATRY | Facility: CLINIC | Age: 19
End: 2024-12-11
Payer: COMMERCIAL

## 2024-12-11 DIAGNOSIS — F39 MOOD DISORDER: Primary | ICD-10-CM

## 2024-12-11 DIAGNOSIS — F12.20 CANNABIS USE DISORDER, SEVERE, DEPENDENCE: ICD-10-CM

## 2024-12-11 DIAGNOSIS — F41.9 ANXIETY: ICD-10-CM

## 2024-12-11 RX ORDER — FLUOXETINE HYDROCHLORIDE 20 MG/1
20 CAPSULE ORAL DAILY
Qty: 30 CAPSULE | Refills: 1 | Status: SHIPPED | OUTPATIENT
Start: 2024-12-11 | End: 2025-12-11

## 2024-12-11 RX ORDER — ARIPIPRAZOLE 5 MG/1
5 TABLET ORAL DAILY
Qty: 30 TABLET | Refills: 1 | Status: SHIPPED | OUTPATIENT
Start: 2024-12-11 | End: 2025-12-11

## 2024-12-11 NOTE — TELEPHONE ENCOUNTER
Called and scheduled patient for in clinic visit on 12/30/2024 @ 11 AM in [facility]

## 2024-12-11 NOTE — PROGRESS NOTES
The patient location is:  [place], [place]  The patient location [place] is: [place]  The patient phone number is: [phone]  Visit type: Virtual visit with synchronous audio and video  Each patient to whom he or she provides medical services by telemedicine is:  (1) informed of the relationship between the physician and patient and the respective role of any other health care provider with respect to management of the patient; and (2) notified that he or she may decline to receive medical services by telemedicine and may withdraw from such care at any time.     Outpatient Psychiatry Follow-Up Visit    Clinical Status of Patient: Outpatient (Ambulatory)  12/11/2024     Chief Complaint:  presenting today for a follow-up.       Interval History and Content of Current Session:  Interim Events/Subjective Report/Content of Current Session:  follow-up appointment.    Pt is a 19 y/o female with past psychiatric hx of mood disorder, anxiety, cannabis abuse who presents for follow-up treatment. Pt reported           that she quit her job due to stress. Wants to focus on school. Stated that she has stopped taking the Abilify. Noted some irritability initially but stated that this has subsided. Stated that she continues to smoke cannabis every day. Stated that it helps with back pain and anxiety. Continues to vomit from anxiety about 3x per week. Some anxiety while driving. Listening to music helps. Pt stated that she is not interested in any medication management and wants to focus on therapy.     Past Psychiatric hx: Aripiprazole, mirtazapine, paroxetine, propranolol, THC Rx, Adderall, trazodone, atomoxetine (Strattera)    Past Medical hx:   Past Medical History:   Diagnosis Date    Acne 9 years old    Facial trauma Acne?? Very dry skin, dry eyes    Hearing loss My fault    Iron deficiency anemia due to chronic blood loss 11/01/2022    Jaundice     birth    Mental disorder 17    ADD, im being treated for borderline  personality disorder, ocd, adhd, pmdd, etc    Seasonal allergies Born    Seizures Around 9, only arpund sunlight flickering        Interim hx:  Medication changes last visit:     Anxiety: moderate to severe  Depression: mild     Denies suicidal/homicidal ideations.  Denies hopelessness/worthlessness.    Denies auditory/visual hallucinations      Alcohol: Infrequent use  Drug: Pt denies  Tobacco: Pt denies      Review of Systems   PSYCHIATRIC: Pertinent items are noted in the narrative.        CONSTITUTIONAL: weight stable    Past Medical, Family and Social History: The patient's past medical, family and social history have been reviewed and updated as appropriate within the electronic medical record. See encounter notes.     Current Psychiatric Medication:  Pt denied     Compliance: limited     Side effects: Pt denies     Risk Parameters:  Patient reports no suicidal ideation  Patient reports no homicidal ideation  Patient reports no self-injurious behavior  Patient reports no violent behavior     Exam (detailed: at least 9 elements; comprehensive: all 15 elements)   Constitutional  Vitals:  Most recent vital signs, dated less than 90 days prior to this appointment, were reviewed. BP: ()/()   Arterial Line BP: ()/()       General:  unremarkable, age appropriate, casual attire, good eye contact, good rapport       Musculoskeletal  Muscle Strength/Tone:  no flaccidity, no tremor    Gait & Station:  normal      Psychiatric                       Speech:  normal tone, normal rate, rhythm, and volume   Mood & Affect:   Mildly depressed, anxious         Thought Process:   Goal directed; Linear    Associations:   intact   Thought Content:   No SI/HI, delusions, or paranoia, no AV/VH   Insight & Judgement:   Good, adequate to circumstances   Orientation:   grossly intact; alert and oriented x 4    Memory:  intact for content of interview    Language:  grossly intact, can repeat    Attention Span  : Grossly intact for  content of interview   Fund of Knowledge:   intact and appropriate to age and level of education        Assessment and Diagnosis   Status/Progress: Based on the examination today, the patient's problem(s) is/are adequately controlled.  New problems have not been presented today. Co-morbidities are not complicating management of the primary condition. There are no active rule-out diagnoses for this patient at this time.      Impression: Pt appears to have some mild mood symptoms and anxiety. Continues to use cannabis daily, which is likely contributing to these symptoms. MI techniques to induce change talk and psychoeducation provided. Pt has refused medication management and will focus on therapy. Assured pt that she can return in the future if needed.     Diagnosis:   1) Mood disorder  2) Anxiety Disorder  3) Cannabis use disorder  Intervention/Counseling/Treatment Plan   Medication Management:      1. Cont in therapy with Alexandria Castillo LPC    Psychotherapy: 20 minutes  Target symptoms: cannabis use  Why chosen therapy is appropriate versus another modality: CBT used; relevant to diagnosis, patient responds to this modality  Outcome monitoring methods: self-report, observation  Therapeutic intervention type: MI techniques  Topics discussed/themes: building skills sets for symptom management, symptom recognition, nutrition, exercise  The patient's response to the intervention is accepting  Patient's response to treatment is: good.   The patient's progress toward treatment goals: improving     Return to clinic: as needed    -Cognitive-Behavioral/Supportive therapy and psychoeducation provided  -R/B/SE's of medications discussed with the pt who expresses understanding and chooses to take medications as prescribed.   -Pt instructed to call clinic, 911 or go to nearest emergency room if sxs worsen or pt is in   crisis. The pt expresses understanding.    Richi Francisco, PhD, MP

## 2024-12-11 NOTE — TELEPHONE ENCOUNTER
----- Message from Richi Francisco sent at 12/11/2024  2:27 PM CST -----  Regarding: apt  Pt needs a f/u apt in 3 weeks. In-person preferable but not required. LIAM

## 2024-12-11 NOTE — PROGRESS NOTES
"The patient location is:  [place], [place]  The patient location [place] is: [place]  The patient phone number is: [phone]  Visit type: Virtual visit with synchronous audio and video  Each patient to whom he or she provides medical services by telemedicine is:  (1) informed of the relationship between the physician and patient and the respective role of any other health care provider with respect to management of the patient; and (2) notified that he or she may decline to receive medical services by telemedicine and may withdraw from such care at any time.     Outpatient Psychiatry Follow-Up Visit    Clinical Status of Patient: Outpatient (Ambulatory)  12/11/2024     Chief Complaint:  presenting today for a follow-up.       Interval History and Content of Current Session:  Interim Events/Subjective Report/Content of Current Session:  follow-up appointment.    Pt is a 19 y/o female with past psychiatric hx of mood disorder, anxiety, cannabis abuse who presents for follow-up treatment. Pt reported that she is feeling depressed. More difficulty "getting through things" (e.g., breakup with partner). Pt stated that she is having more difficulty managing emotions and it is negatively impacting her school work. Pt stated that she is "sad all of the time". Having difficulty falling asleep due to perseveration. Pt stated that she "feels like everything is ruined" each day when she wakes up. Anger about previous relationship. Stated that she is irritable and taking it out on parents. Continues to smoke cannabis daily. Has not been vomiting from anxiety recently. Pt denied self-harm. Pt reported that she has not seen her therapist but plans to reschedule. Pt denied SI, plan or intent.     Past Psychiatric hx: Aripiprazole, mirtazapine, paroxetine, propranolol, THC Rx, Adderall, trazodone, atomoxetine (Strattera)    Past Medical hx:   Past Medical History:   Diagnosis Date    Acne 9 years old    Facial trauma Acne?? Very " dry skin, dry eyes    Hearing loss My fault    Iron deficiency anemia due to chronic blood loss 11/01/2022    Jaundice     birth    Mental disorder 17    ADD, im being treated for borderline personality disorder, ocd, adhd, pmdd, etc    Seasonal allergies Born    Seizures Around 9, only arpund sunlight flickering        Interim hx:  Medication changes last visit: Pt had discontinued all medications against medical advice.     Anxiety: moderate to severe  Depression: moderate     Denies suicidal/homicidal ideations.  Denies hopelessness/worthlessness.    Denies auditory/visual hallucinations      Alcohol: Infrequent use  Drug: Pt denies  Tobacco: Pt denies      Review of Systems   PSYCHIATRIC: Pertinent items are noted in the narrative.        CONSTITUTIONAL: weight stable    Past Medical, Family and Social History: The patient's past medical, family and social history have been reviewed and updated as appropriate within the electronic medical record. See encounter notes.     Current Psychiatric Medication:  Pt denied     Compliance: limited     Side effects: Pt denies     Risk Parameters:  Patient reports no suicidal ideation  Patient reports no homicidal ideation  Patient reports no self-injurious behavior  Patient reports no violent behavior     Exam (detailed: at least 9 elements; comprehensive: all 15 elements)   Constitutional  Vitals:  Most recent vital signs, dated less than 90 days prior to this appointment, were reviewed. BP: ()/()   Arterial Line BP: ()/()       General:  unremarkable, age appropriate, casual attire, good eye contact, good rapport       Musculoskeletal  Muscle Strength/Tone:  no flaccidity, no tremor    Gait & Station:  normal      Psychiatric                       Speech:  normal tone, normal rate, rhythm, and volume   Mood & Affect:   Depressed, anxiety         Thought Process:   Goal directed; Linear    Associations:   intact   Thought Content:   No SI/HI, delusions, or paranoia, no  AV/VH   Insight & Judgement:   Good, adequate to circumstances   Orientation:   grossly intact; alert and oriented x 4    Memory:  intact for content of interview    Language:  grossly intact, can repeat    Attention Span  : Grossly intact for content of interview   Fund of Knowledge:   intact and appropriate to age and level of education        Assessment and Diagnosis   Status/Progress: Based on the examination today, the patient's problem(s) is/are adequately controlled.  New problems have not been presented today. Co-morbidities are not complicating management of the primary condition. There are no active rule-out diagnoses for this patient at this time.      Impression: Pt presents with depressed mood and anxiety. Has be perseverating about attachment tears and having difficulty managing emotions. Continues to use cannabis daily. MI techniques to reduce cannabis use. Explored emotional regulation and attachment based concerns (e.g., bpd). Will restart trial of aripiprazole and start a trial of fluoxetine. Pt agreed to schedule with her therapist and we discussed the possibility of IOP.     Diagnosis:   1) Mood disorder  2) Anxiety Disorder  3) Cannabis use disorder  4) Axis II traits  Intervention/Counseling/Treatment Plan   Medication Management:      1. Start a trial of fluoxetine 20 mg    2. Start a trial of aripiprazole 5 mg    3. Cont in therapy with Alexandria Castillo LPC    Psychotherapy: 20 minutes  Target symptoms: cannabis use, emotional dysregulation  Why chosen therapy is appropriate versus another modality: CBT used; relevant to diagnosis, patient responds to this modality  Outcome monitoring methods: self-report, observation  Therapeutic intervention type: MI techniques, coping strategies  Topics discussed/themes: building skills sets for symptom management, symptom recognition, nutrition, exercise  The patient's response to the intervention is accepting  Patient's response to treatment is: good.    The patient's progress toward treatment goals: improving     Return to clinic: 3 weeks    -Cognitive-Behavioral/Supportive therapy and psychoeducation provided  -R/B/SE's of medications discussed with the pt who expresses understanding and chooses to take medications as prescribed.   -Pt instructed to call clinic, 911 or go to nearest emergency room if sxs worsen or pt is in   crisis. The pt expresses understanding.    Richi Francisco, PhD, MP

## 2025-01-02 NOTE — TELEPHONE ENCOUNTER
"Kalina Ryan, a 59 y.o. female was seen today in the clinic for follow up audiologic evaluation. She was initially seen on 12-17-24 with report of sudden onset hearing loss and tinnitus.  She was seen on 12-18-24 by nurse practitioner, Jammie Madera, and treated for otitis media and eustachian tube dysfunction. Hearing evaluation noted improvement in threshold of the right ear as compared to previous day's evaluation. Today, Ms. Ryan reports persistent sensation of right ear feeling "blocked" and that she is unable to "pop" it.     Pure tone testing revealed mild mixed hearing loss in the right ear and essentially normal hearing in the left ear. Speech testing was not repeated today. Tympanometry revealed Type A tympanograms in both ears.    Today's results reveal improvement in both ears as compared to initial audiogram on 12-17-24.    Recommendations:  Otologic evaluation  Repeat audiologic evaluation to monitor loss  Hearing protection in noise  Hearing aid consultation should be considered if loss is not further resolved with medical treatment                  " Faxed over school excuse.

## 2025-01-06 ENCOUNTER — OFFICE VISIT (OUTPATIENT)
Dept: URGENT CARE | Facility: CLINIC | Age: 20
End: 2025-01-06
Payer: COMMERCIAL

## 2025-01-06 VITALS
HEART RATE: 87 BPM | SYSTOLIC BLOOD PRESSURE: 123 MMHG | TEMPERATURE: 98 F | BODY MASS INDEX: 19.36 KG/M2 | DIASTOLIC BLOOD PRESSURE: 90 MMHG | RESPIRATION RATE: 18 BRPM | WEIGHT: 116.19 LBS | HEIGHT: 65 IN | OXYGEN SATURATION: 99 %

## 2025-01-06 DIAGNOSIS — R11.2 NAUSEA AND VOMITING, UNSPECIFIED VOMITING TYPE: Primary | ICD-10-CM

## 2025-01-06 LAB
B-HCG UR QL: NEGATIVE
CTP QC/QA: YES
POC MOLECULAR INFLUENZA A AGN: NEGATIVE
POC MOLECULAR INFLUENZA B AGN: NEGATIVE
SARS-COV-2 AG RESP QL IA.RAPID: NEGATIVE

## 2025-01-06 PROCEDURE — 99213 OFFICE O/P EST LOW 20 MIN: CPT | Mod: 25,S$GLB,, | Performed by: NURSE PRACTITIONER

## 2025-01-06 PROCEDURE — 81025 URINE PREGNANCY TEST: CPT | Mod: S$GLB,,, | Performed by: NURSE PRACTITIONER

## 2025-01-06 PROCEDURE — 96372 THER/PROPH/DIAG INJ SC/IM: CPT | Mod: S$GLB,,, | Performed by: NURSE PRACTITIONER

## 2025-01-06 PROCEDURE — 87502 INFLUENZA DNA AMP PROBE: CPT | Mod: QW,S$GLB,, | Performed by: NURSE PRACTITIONER

## 2025-01-06 PROCEDURE — 87811 SARS-COV-2 COVID19 W/OPTIC: CPT | Mod: QW,S$GLB,, | Performed by: NURSE PRACTITIONER

## 2025-01-06 RX ORDER — ONDANSETRON 4 MG/1
4 TABLET, ORALLY DISINTEGRATING ORAL EVERY 12 HOURS PRN
Qty: 14 TABLET | Refills: 0 | Status: SHIPPED | OUTPATIENT
Start: 2025-01-06

## 2025-01-06 RX ORDER — ONDANSETRON 2 MG/ML
4 INJECTION INTRAMUSCULAR; INTRAVENOUS
Status: COMPLETED | OUTPATIENT
Start: 2025-01-06 | End: 2025-01-06

## 2025-01-06 RX ADMIN — ONDANSETRON 4 MG: 2 INJECTION INTRAMUSCULAR; INTRAVENOUS at 05:01

## 2025-01-06 NOTE — PROGRESS NOTES
"Subjective:      Patient ID: Tamanna Waters is a 19 y.o. adult.    Vitals:  height is 5' 5" (1.651 m) and weight is 52.7 kg (116 lb 2.9 oz). Tamanna's oral temperature is 97.7 °F (36.5 °C). Tamanna's blood pressure is 123/90 (abnormal) and Tamanna's pulse is 87. Tamanna's respiration is 18 and oxygen saturation is 99%.     Chief Complaint: Nausea    Pt has nausea and vomiting. Pt states this has been on going all day today. Pt states her body hurts from vomiting. Pt has chills, body aches, and diarrhea. Pt states she smoked last night and could have possibly induced the vomiting. Pt states she has taken 3 Zofran today, but can not hold down the medication.     Provider note begins below:   Patient denies any fever or chills.  Denies any sick contacts.  Patient does report that she smokes THC daily and this may have exacerbated her nausea/vomiting.  Actively vomiting during exam.    Other  This is a new problem. The current episode started today. The problem occurs constantly. Associated symptoms include nausea and vomiting. Pertinent negatives include no arthralgias, chest pain, chills, congestion, coughing, diaphoresis, fatigue, rash or sore throat. Treatments tried: zofran. The treatment provided no relief.       Constitution: Negative. Negative for chills, sweating and fatigue.   HENT: Negative.  Negative for ear pain, facial swelling, congestion and sore throat.    Neck: Negative for painful lymph nodes.   Cardiovascular: Negative.  Negative for chest trauma, chest pain and sob on exertion.   Eyes: Negative.  Negative for eye itching and eye pain.   Respiratory: Negative.  Negative for chest tightness, cough and asthma.    Gastrointestinal:  Positive for nausea and vomiting. Negative for diarrhea.   Endocrine: negative. cold intolerance and excessive thirst.   Genitourinary: Negative.  Negative for dysuria, frequency, urgency and hematuria.   Musculoskeletal:  Negative for pain, trauma and joint pain. "   Skin: Negative.  Negative for rash, wound and hives.   Allergic/Immunologic: Negative.  Negative for eczema, asthma, hives and itching.   Neurological: Negative.  Negative for disorientation and altered mental status.   Hematologic/Lymphatic: Negative.  Negative for swollen lymph nodes.   Psychiatric/Behavioral: Negative.  Negative for altered mental status, disorientation and confusion.       Objective:     Physical Exam   Constitutional: Tamanna is oriented to person, place, and time. Tamanna appears well-developed. Tamanna is cooperative.  Non-toxic appearance. Tamanna does not appear ill. No distress.   HENT:   Head: Normocephalic and atraumatic.   Ears:   Right Ear: Hearing, tympanic membrane, external ear and ear canal normal. no impacted cerumen  Left Ear: Hearing, tympanic membrane, external ear and ear canal normal. no impacted cerumen  Nose: Nose normal. No mucosal edema, rhinorrhea, nasal deformity or congestion. No epistaxis. Right sinus exhibits no maxillary sinus tenderness and no frontal sinus tenderness. Left sinus exhibits no maxillary sinus tenderness and no frontal sinus tenderness.   Mouth/Throat: Uvula is midline, oropharynx is clear and moist and mucous membranes are normal. No trismus in the jaw. Normal dentition. No uvula swelling. No oropharyngeal exudate, posterior oropharyngeal edema or posterior oropharyngeal erythema.   Eyes: Conjunctivae and lids are normal. No scleral icterus.   Neck: Trachea normal and phonation normal. Neck supple. No edema present. No erythema present. No neck rigidity present.   Cardiovascular: Normal rate, regular rhythm, normal heart sounds and normal pulses.   Pulmonary/Chest: Effort normal and breath sounds normal. No stridor. No respiratory distress. Tamanna has no decreased breath sounds. Tamanna has no wheezes. Tamanna has no rhonchi. Tamanna has no rales. Tamanna exhibits no tenderness.   Abdominal: Normal appearance.   Musculoskeletal: Normal range of  motion.         General: No deformity. Normal range of motion.   Lymphadenopathy:     Tamanna has no cervical adenopathy.   Neurological: no focal deficit. Tamanna is alert, oriented to person, place, and time and at baseline. Tamanna exhibits normal muscle tone. Coordination normal.   Skin: Skin is warm, dry, intact, not diaphoretic and not pale.   Psychiatric: Tamanna's speech is normal and behavior is normal. Mood, judgment and thought content normal.   Nursing note and vitals reviewed.  Patient was able to keep water down following Zofran injection.    Assessment:     1. Nausea and vomiting, unspecified vomiting type        Plan:   Mother/patient given strict ER precautions for any worsening symptoms or failure to improve.      FOLLOWUP  Follow up if symptoms worsen or fail to improve, for PLEASE CONTACT PCP OR CONTACT THE EMERGENCY ROOM..     PATIENT INSTRUCTIONS  Patient Instructions   INSTRUCTIONS:  - Rest.  - Drink plenty of fluids.  - Take Tylenol and/or Ibuprofen as directed as needed for fever/pain.  Do not take more than the recommended dose.  - follow up with your PCP within the next 1-2 weeks as needed.  - You must understand that you have received an Urgent Care treatment only and that you may be released before all of your medical problems are known or treated.   - You, the patient, will arrange for follow up care as instructed.   - If your condition worsens or fails to improve we recommend that you receive another evaluation at the ER immediately or contact your PCP to discuss your concerns.   - You can call [phone] or [phone] to help schedule an appointment with the appropriate provider.     -If you smoke cigarettes, it would be beneficial for you to stop.         THANK YOU FOR ALLOWING ME TO PARTICIPATE IN YOUR HEALTHCARE,     Valeriano Galloway NP     Nausea and vomiting, unspecified vomiting type  -     POCT urine pregnancy  -     ondansetron injection 4 mg  -     POCT Influenza A/B  MOLECULAR  -     SARS Coronavirus 2 Antigen, POCT Manual Read  -     ondansetron (ZOFRAN-ODT) 4 MG TbDL; Take 1 tablet (4 mg total) by mouth every 12 (twelve) hours as needed (nausea).  Dispense: 14 tablet; Refill: 0

## 2025-01-06 NOTE — PATIENT INSTRUCTIONS

## 2025-01-10 ENCOUNTER — OFFICE VISIT (OUTPATIENT)
Dept: PSYCHIATRY | Facility: CLINIC | Age: 20
End: 2025-01-10
Payer: COMMERCIAL

## 2025-01-10 VITALS
BODY MASS INDEX: 18.3 KG/M2 | WEIGHT: 109.81 LBS | HEART RATE: 69 BPM | DIASTOLIC BLOOD PRESSURE: 74 MMHG | SYSTOLIC BLOOD PRESSURE: 112 MMHG | HEIGHT: 65 IN

## 2025-01-10 DIAGNOSIS — F12.20 CANNABIS USE DISORDER, SEVERE, DEPENDENCE: Primary | ICD-10-CM

## 2025-01-10 PROCEDURE — 99999 PR PBB SHADOW E&M-EST. PATIENT-LVL III: CPT | Mod: PBBFAC,,, | Performed by: NEUROMUSCULOSKELETAL MEDICINE & OMM

## 2025-01-10 RX ORDER — ACETYLCYSTEINE 600 MG
600 CAPSULE ORAL 4 TIMES DAILY
Qty: 120 CAPSULE | Refills: 1 | Status: SHIPPED | OUTPATIENT
Start: 2025-01-10 | End: 2025-03-11

## 2025-01-10 NOTE — PATIENT INSTRUCTIONS
Follow up in one week. Take medications as prescribed. Contact the clinic with any concerns. If you feel the concerns are of a very serious (example: seizure, suicidal thoughts, hallucinations, severe illness) call 911 or go to the nearest emergency department.

## 2025-01-10 NOTE — PROGRESS NOTES
Outpatient Encounter - Addiction Medicine    ESTABLISHED PATIENT EVALUATION    DAVID Sanchez DO  Board Certified - Addiction Medicine  Board Certified - Neuromusculoskeletal Medicine and [facility]    Date of Service: 1/10/2025      Assessment & Plan      ASSESSMENT / PLAN     Clinical  Impression   And Plan      ICD-10-CM ICD-9-CM   1. Cannabis use disorder, severe, dependence  F12.20 304.30     Will start NAC 600mg QID to help achieve cannabis abstinence. Patient was informed that there are no FDA approved medications for cannabis use disorder and that both NAC and naltrexone have some evidence to support their use. The patient elected to try NAC. Patient was counseled on refraining from cannabis use in general; however, the patient wishes to achieve this by tapering. The patient does not want to go to treatment and does not want to start with complete abstinence. The patient wishes to taper to cessation. Using shared decision making, it was decided that the patient is to log all doses and measure each dose. Patient was instructed that the largest dose should be in the evening to minimize morning withdrawal symptoms. Patient will report back next week findings on regular daily use. At that time we will work on a tapering schedule. Patient does not want to try hydroxyzine for anxiety related to withdrawal. She is open to taking 12.5-25 mg of benadryl TID as needed for anxiety related to withdrawal.    Monitor Symptoms.  Discussion, counseling, and education provided.  Discussed RX administration and adherence  Patient provided anticipatory guidance regarding treatment.  Motivational interviewing utilized.  Discussed risks of acute and chronic use of cannabis.  Discussed withdrawal symptoms and management.  Follow up in about 1 week (around 1/17/2025) for Virtual Visit.     Orders Placed This Encounter    acetylcysteine (NAC) 600 mg Cap        Patient Instructions   Follow up in one week. Take medications as  prescribed. Contact the clinic with any concerns. If you feel the concerns are of a very serious (example: seizure, suicidal thoughts, hallucinations, severe illness) call 911 or go to the nearest emergency department.          Subjective      SUBJECTIVE      Tamanna Waters presents today for evaluation and management of cannabis use disorder.    Review of systems performed and found unremarkable except as stated below.     01/10/2025   Patient recently discharged from ER due to what was thought to be cannabis hyperemesis syndrome. She is accompanied by her mother who is supportive in her current decision to quit marijuana. Mother aided some in history; however, did allow patient to do most of the talking.     Regarding marijuana use. Admits to recent substance use. Last use was last night. Admits to cravings. Cravings occur daily.  Not taking any medications to help with cravings or withdrawals. Not involved in mutual support groups for addiction. Attends school; however, currently on a break. Patient starts school again in late February. Currently employed; Patient drives for [employer]. Anxiety worse. Mood stable. Complains of back pain. Interested in having me evaluate back pain in the future. It is one reason use frequently uses marijuana.  Motivational interviewing used. Discussed medications.    Patient does not want to go to residential treatment or IOP. Patient had really bad experiences in the past. Patient also stated that she was picked on by other women at one program. After much discussion she decided that she wants to try an taper to cessation. Patient is willing to consider residential treatment or IOP at a later time if tapering proves unsuccessful.     Patient was informed that there are no FDA approved medications for cannabis use disorder and that both NAC and naltrexone have some evidence to support their use. The patient elected to try NAC. Patient was counseled on refraining from cannabis  use in general; however, the patient wishes to achieve this by tapering. The patient does not want to go to treatment and does not want to start with complete abstinence. The patient wishes to taper to cessation. Using shared decision making, it was decided that the patient is to log all doses and measure each dose. Patient was instructed that the largest dose should be in the evening to minimize morning withdrawal symptoms. Patient will report back next week findings on regular daily use. At that time we will work on a tapering schedule.     Relevant previous encounter(s):    01/10/2025   Patient is a pleasant 18-year-old adult who came to see me to discuss her anxiety.  She originally thought that was a psychiatrist who can help her whether anxiety.  Despite the misunderstanding the patient was very open to discussing her cannabis and nicotine use.  She has a complex psychiatric history and is currently being treated by Reena Tate NP.  She is also seeing Alexandria Castillo LPC on a regular basis.     She is going to school to become an  and is currently working.     The patient admits that her issues really started at the age of 15.  That is when she started vaping and using THC.  She states she was in a long-term relationship and her boyfriend at the time was keeping her supplied with THC gummies.  She notes issues at that time with work and her relationship.       Regarding cannabis use.  The patient consumes cannabis on a daily basis and has done so for approximately 3 years.  She seems to be somewhat aware that it is causing her problem.  She was unaware that continued use could cause psychiatric issues such as anxiety and cognitive deficits.  The patient admits that she takes cannabis to help her open up to others and feel more normal.  She denies problems opening up to others before the age of 15 (her 1st reported use).  She also states before the age of 15 her mind worked faster and she did not  have difficulties coming up with a quick answer.  We discussed the changes in the brain cognition that occur due to chronic THC use that have been found in the literature.  We also discussed how the literature shows that these changes have been found to improve in his little as 3 months of abstinence.  The patient denies symptoms of cannabis hyperemesis syndrome.  She also states she enjoys the process of rolling.  She also states she enjoys learning more about marijuana.  She admits to the following symptoms when she is without marijuana: restlessness, anxiety, agitation, and abdominal pain.      Regarding nicotine vaping.  The patient states she vapes every day and has done so for approximately 3 years.  She admits that his habitual at this time.  We discussed with the patient that chronic vaping is associated with psychiatric issue such as anxiety and depression. She admits to restlessness and anxiety when she can't vape.     She states she went to rehab over the summer but only stayed for 3 days before she ran away.  She states that her mother forced her to go.  When discussing rehab she does think that it could benefit her when she decides to quit.  At this time she seems to be in the contemplative phase.  On a scale of 1-10 regarding her motivation to quit she states she is at a 5.  When asked why she was not at a 2 or a 3 she states that she knows that it is harmful for her in the long run but that she needs it right now.  We discussed that one of the reasons she may need it at the present moment is because she is going to withdrawals she felt that that made sense.  She is not ready to quit at this moment.  She was provided with educational handouts regarding THC risks, nicotine risks, and education on addiction and physical dependence.  Patient was instructed to contact the clinic when she is ready to begin treatment for cannabis and/or nicotine use.         Objective      OBJECTIVE     Physical Exam  "    Vitals:    01/10/25 1400   BP: 112/74   Pulse: 69   Weight: 49.8 kg (109 lb 12.6 oz)   Height: 5' 5" (1.651 m)        Physical Exam  Constitutional:       General: Tamanna is not in acute distress.     Appearance: Normal appearance.   HENT:      Head: Normocephalic and atraumatic.   Eyes:      General: No scleral icterus.     Conjunctiva/sclera: Conjunctivae normal.      Pupils: Pupils are equal, round, and reactive to light.   Pulmonary:      Effort: Pulmonary effort is normal. No respiratory distress.   Neurological:      Mental Status: Tamanna is alert and oriented to person, place, and time.   Psychiatric:         Attention and Perception: Attention normal.         Mood and Affect: Mood and affect normal.         Speech: Speech normal.         Behavior: Behavior normal. Behavior is cooperative.          Labs ( * = no data in EHR )      POC Urine Drug Screen (*)   Toxicology Results in Chart      AMP * AMP *  (*)   BAR * BAR *  (*)   BUP * BUP *  (*)   BZO * BZO Negative (5/11/2024)   PHILL * PHILL Negative (5/11/2024)   METH * METH *  (*)   MOR * MOR Negative (5/11/2024)   MTD * MTD *  (*)   OXY * OXY *  (*)   THC * THC *  (*)      Fentanyl *  (*)    Alcohol Level <10 (5/11/2024)     Recent CBC, CMP, and other routine labs     WBC 10.87 (5/11/2024)  (5/11/2024)   RBC    3.95 (L) (5/11/2024) K 4.3 (5/11/2024)   HGB 11.9 (L) (5/11/2024) Cl 103 (5/11/2024)   HCT 37.0 (5/11/2024) CO2 27 (5/11/2024)   MCV 94 (5/11/2024) Glu 114 (H) (5/11/2024)   MCH 30.1 (5/11/2024) BUN 5 (L) (5/11/2024)   MCHC 32.2 (5/11/2024) Cr 0.63 (5/11/2024)   RDW 12.0 (5/11/2024) Ca 9.6 (5/11/2024)    (5/11/2024)AST Alb 4.8 (5/11/2024)   MPV 9.0 (L) (5/11/2024) TBili 0.3 (5/11/2024)   ANC 7.1; 65.5;  (5/11/2024) AlkPho 75 (5/11/2024)   ESR *  (*) AST 36 (5/11/2024)   CRP *  (*) ALT 19 (5/11/2024)      CPK *  (*) GFR >60 (5/11/2024)   HgA1c *  (*) TSH 1.122 (10/26/2022)   Trop I *  (*) FT4 *  (*)   Chol 197 (3/8/2023) uHCG Negative " (1/6/2025)   TGs 55 (3/8/2023) HCV *  (*)   HDL 65 (3/8/2023) HIV Non-reactive (3/8/2023)   .0 (3/8/2023) HBV sAB *  (*)   CHRISSY *  (*) HBV sAG Non-reactive (3/8/2023)   LIP *  (*) RPR Non-reactive (3/8/2023)   INR *  (*) B1 *  (*)   GGT *  (*) B9 *  (*)   NH4 *  (*) B12 459 (10/26/2022)   PETH *  (*) Vit D *  (*)            NOTE: Portions of this documentation were dictated using voice recognition software and  may contain dictation related errors in spelling / grammar / syntax not discovered on text review.            DAVID Sanchez DO    Board Certified, Addiction Medicine  Board Certified, Neuromusculoskeletal Medicine and [facility]  Department of Psychiatry, [facility]      Method of Encounter   IN PERSON visit at the clinic   Type of Encounter   Follow up visit with me   E/M Code (+/- modifier)   00950: Office E&M of an ESTABLISHED patient, level 5, (40 to 54 minutes)  53089: Prolonged office E&M service (additional 15 min)  , without modifiers -24,-25,-53: COMPLEXITY: Visit today included increased complexity associated with the care of the episodic problem(s) addressed and managing the longitudinal care of the patient due to the serious and/or complex managed problem(s). (See Assessment/Plan for problems addressed)     Separate from E/M same visit   None   Total Mins  (01/10/2025) I spent a total of 75 minutes evaluating and managing the patient on the day of the service. This includes time reviewing the patient's chart, performing a history and physical examination, patient education and counseling, documentation, and care coordination.

## 2025-01-17 ENCOUNTER — OFFICE VISIT (OUTPATIENT)
Dept: PSYCHIATRY | Facility: CLINIC | Age: 20
End: 2025-01-17
Payer: COMMERCIAL

## 2025-01-17 ENCOUNTER — TELEPHONE (OUTPATIENT)
Dept: PSYCHIATRY | Facility: CLINIC | Age: 20
End: 2025-01-17
Payer: COMMERCIAL

## 2025-01-17 DIAGNOSIS — F17.200 NICOTINE DEPENDENCE DUE TO VAPING NON-TOBACCO PRODUCT: ICD-10-CM

## 2025-01-17 DIAGNOSIS — F12.20 CANNABIS USE DISORDER, SEVERE, DEPENDENCE: Primary | ICD-10-CM

## 2025-01-17 NOTE — PROGRESS NOTES
Encounter for Virtual Visit - Addiction Medicine    DAVID Sanchez DO  Board Certified - Addiction Medicine  Board Certified - Neuromusculoskeletal Medicine and [facility]    Licensed in:   [place] (#[id number]), [place] (#[id number]), [place] (#[id number]), and [place] (#[phone])    Date of Service: 1/17/2025  The patient location is:     [address] [place] [address] [address] [address]   The patient State: [place]  The patient phone number is: [phone]   Visit type: Virtual visit with synchronous audio and video  Each patient to whom is provided medical services by telemedicine is:  (1) informed of the relationship between the physician and patient and the respective role of any other health care provider with respect to management of the patient; and (2) notified that he or she may decline to receive medical services by telemedicine and may withdraw from such care at any time.  Crisis Disclaimer: Patient was informed that due to the virtual nature of the visit, that if a crisis develops, protocols will be implemented to ensure patient safety, including but not limited to: 1) Initiating a welfare check with local Law Enforcement, 2) Calling [facility]/National Crisis Hotline, and/or 3) Initiating PEC/CEC procedures.     Assessment & Plan      ASSESSMENT / PLAN     Clinical  Impression   And Plan      ICD-10-CM ICD-9-CM   1. Cannabis use disorder, severe, dependence  F12.20 304.30   2. Nicotine dependence due to vaping non-tobacco product  F17.200 305.1     Start N-acetyl Cysteine. Continue with 2 pipe bowls per day (morning and night). When ready patient is going to try and do just one pipe bowl in the evening.     Monitor Symptoms.  Discussion, counseling, and education provided.  Discussed RX administration and adherence  Patient provided anticipatory guidance regarding treatment.  Motivational interviewing utilized.  Discussed replacement behaviors/activities. Patient interested in art, drawing, hair and makeup.  Follow up 1-2  weeks.           Patient Instructions   Follow up in 1-2 weeks virtually or in person.  Take medications as prescribed. Contact the clinic with any concerns. If you feel the concerns are of a very serious (example: seizure, suicidal thoughts, hallucinations, severe illness) call 911 or go to the nearest emergency department.        Subjective      SUBJECTIVE     History of Present Illness      Tamanna Waters presents today for evaluation and management of cannabis use.    Review of systems performed and found unremarkable except as stated below.     01/17/2025   Regarding cannabis use. Patient doing well. Admits to recent substance use. Smoking 1 pipe bowl in the morning and 1 at night. Admits to cravings. Middle of the day is hard. Pharmacy was out of NAC and patient will start taking that soon. It was filled today. Hanging out with friends to keep busy. Friends are supportive. Not involved in mutual support groups for addiction. 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 Hopeful about job. No concerns regarding home-life. Anxiety stable. Mood stable. Denies pain. Motivational interviewing used. Relapse prevention discussed. Discussed medications.    Comments: Eating has been better. Patient having more of an appetite. Still vaping regularly. Patient has not been using THC vape. She tried it recently and found that it made her feel bad. So she is sticking to just her pipe 2 times per day.     01/10/2025   Patient recently discharged from ER due to what was thought to be cannabis hyperemesis syndrome. She is accompanied by her mother who is supportive in her current decision to quit marijuana. Mother aided some in history; however, did allow patient to do most of the talking.      Regarding marijuana use. Admits to recent substance use. Last use was last night. Admits to cravings. Cravings occur daily.  Not taking any  medications to help with cravings or withdrawals. Not involved in mutual support groups for addiction. Attends school; however, currently on a break. Patient starts school again in late February. Currently employed; Patient drives for [employer]. Anxiety worse. Mood stable. Complains of back pain. Interested in having me evaluate back pain in the future. It is one reason use frequently uses marijuana.  Motivational interviewing used. Discussed medications.     Patient does not want to go to residential treatment or IOP. Patient had really bad experiences in the past. Patient also stated that she was picked on by other women at one program. After much discussion she decided that she wants to try an taper to cessation. Patient is willing to consider residential treatment or IOP at a later time if tapering proves unsuccessful.      Patient was informed that there are no FDA approved medications for cannabis use disorder and that both NAC and naltrexone have some evidence to support their use. The patient elected to try NAC. Patient was counseled on refraining from cannabis use in general; however, the patient wishes to achieve this by tapering. The patient does not want to go to treatment and does not want to start with complete abstinence. The patient wishes to taper to cessation. Using shared decision making, it was decided that the patient is to log all doses and measure each dose. Patient was instructed that the largest dose should be in the evening to minimize morning withdrawal symptoms. Patient will report back next week findings on regular daily use. At that time we will work on a tapering schedule.     Objective      OBJECTIVE     Physical Exam     Physical Exam  Constitutional:       General: Tamanna is not in acute distress.     Appearance: Normal appearance.   HENT:      Head: Normocephalic and atraumatic.   Eyes:      General: No scleral icterus.     Conjunctiva/sclera: Conjunctivae normal.   Pulmonary:       Effort: Pulmonary effort is normal. No respiratory distress.   Neurological:      Mental Status: Tamanna is alert and oriented to person, place, and time.   Psychiatric:         Mood and Affect: Mood normal.         Behavior: Behavior normal.            Labs (auto-populated)     POC Urine Drug Screen (*)   Toxicology Results in Chart      AMP * AMP *  (*)   BAR * BAR *  (*)   BUP * BUP *  (*)   BZO * BZO Negative (5/11/2024)   PHILL * PHILL Negative (5/11/2024)   METH * METH *  (*)   MOR * MOR Negative (5/11/2024)   MTD * MTD *  (*)   OXY * OXY *  (*)   THC * THC *  (*)      Fentanyl *  (*)    Alcohol Level <10 (5/11/2024)     Recent CBC, CMP, and other routine labs     WBC 10.87 (5/11/2024)  (5/11/2024)   RBC    3.95 (L) (5/11/2024) K 4.3 (5/11/2024)   HGB 11.9 (L) (5/11/2024) Cl 103 (5/11/2024)   HCT 37.0 (5/11/2024) CO2 27 (5/11/2024)   MCV 94 (5/11/2024) Glu 114 (H) (5/11/2024)   MCH 30.1 (5/11/2024) BUN 5 (L) (5/11/2024)   MCHC 32.2 (5/11/2024) Cr 0.63 (5/11/2024)   RDW 12.0 (5/11/2024) Ca 9.6 (5/11/2024)    (5/11/2024)AST Alb 4.8 (5/11/2024)   MPV 9.0 (L) (5/11/2024) TBili 0.3 (5/11/2024)   ANC 7.1; 65.5;  (5/11/2024) AlkPho 75 (5/11/2024)   ESR *  (*) AST 36 (5/11/2024)   CRP *  (*) ALT 19 (5/11/2024)      CPK *  (*) GFR >60 (5/11/2024)   HgA1c *  (*) TSH 1.122 (10/26/2022)   Trop I *  (*) FT4 *  (*)   Chol 197 (3/8/2023) uHCG Negative (1/6/2025)   TGs 55 (3/8/2023) HCV *  (*)   HDL 65 (3/8/2023) HIV Non-reactive (3/8/2023)   .0 (3/8/2023) HBV sAB *  (*)   CHRISSY *  (*) HBV sAG Non-reactive (3/8/2023)   LIP *  (*) RPR Non-reactive (3/8/2023)   INR *  (*) B1 *  (*)   GGT *  (*) B9 *  (*)   NH4 *  (*) B12 459 (10/26/2022)   PETH *  (*) Vit D *  (*)            NOTE: Portions of this documentation were dictated using voice recognition software and  may contain dictation related errors in spelling / grammar / syntax not discovered on text review.            DAVID Sanchez DO    Board Certified,  Addiction Medicine  Board Certified, Neuromusculoskeletal Medicine and [facility]  Department of Psychiatry, [facility]      Method of Encounter   AUDIOVISUAL - time on video was approximately 25mins   Type of Encounter   Follow up visit with me   E/M Code (+/- modifier)   88484: Office E&M of an ESTABLISHED patient, level 4, Level 4 criteria: 1 unstable chronic illness and Prescription Drug Management  , without modifiers -24,-25,-53: COMPLEXITY: Visit today included increased complexity associated with the care of the episodic problem(s) addressed and managing the longitudinal care of the patient due to the serious and/or complex managed problem(s). (See Assessment/Plan for problems addressed)     Separate from E/M same visit   None   Total Mins  (01/17/2025) N/A - Not billing for time

## 2025-01-17 NOTE — PATIENT INSTRUCTIONS
Follow up in 1-2 weeks virtually or in person.  Take medications as prescribed. Contact the clinic with any concerns. If you feel the concerns are of a very serious (example: seizure, suicidal thoughts, hallucinations, severe illness) call 911 or go to the nearest emergency department.

## 2025-01-23 ENCOUNTER — TELEPHONE (OUTPATIENT)
Dept: PSYCHIATRY | Facility: CLINIC | Age: 20
End: 2025-01-23
Payer: COMMERCIAL

## 2025-01-23 ENCOUNTER — OFFICE VISIT (OUTPATIENT)
Dept: PSYCHIATRY | Facility: CLINIC | Age: 20
End: 2025-01-23
Payer: COMMERCIAL

## 2025-01-23 DIAGNOSIS — F41.9 ANXIETY: ICD-10-CM

## 2025-01-23 DIAGNOSIS — F12.20 CANNABIS USE DISORDER, SEVERE, DEPENDENCE: ICD-10-CM

## 2025-01-23 DIAGNOSIS — F39 MOOD DISORDER: Primary | ICD-10-CM

## 2025-01-23 NOTE — PROGRESS NOTES
"The patient location is:  [place], [place]  The patient location [place] is: [place]  The patient phone number is: [phone]  Visit type: Virtual visit with synchronous audio and video  Each patient to whom he or she provides medical services by telemedicine is:  (1) informed of the relationship between the physician and patient and the respective role of any other health care provider with respect to management of the patient; and (2) notified that he or she may decline to receive medical services by telemedicine and may withdraw from such care at any time.     Outpatient Psychiatry Follow-Up Visit    Clinical Status of Patient: Outpatient (Ambulatory)  01/23/2025     Chief Complaint:  presenting today for a follow-up.       Interval History and Content of Current Session:  Interim Events/Subjective Report/Content of Current Session:  follow-up appointment.    Pt is a 19 y/o female with past psychiatric hx of mood disorder, anxiety, cannabis abuse who presents for follow-up treatment. Pt reported that she started the fluoxetine and aripiprazole but stopped taking them about a week ago. Pt reported that she did not feel as talkative and stated that her brain was "empty" when taking the medications. Pt reported that she was feeling more isolated. Stated that she is now more talkative and outgoing. Pt noted that her mood fluctuates easily and has difficulty managing it. Pt stated that her feelings are "too strong." Described this as "each time I cry, I feel like I will die... (and) when I am happy I feel euphoric and hope it will last forever." Pt reported that she has been thinking about past trauma often. Stated that she needs to talk to someone about it but has not done so. Denies processing with her therapist. Pt reported that she stopped using cannabis vape and is now smoking only cannabis "flower". This started after the ED visit when diagnosed with Cannabinoid Hyperemesis Syndrome. Pt reported that she she " is trying to reduce use and manage cravings. Has been meeting with Dr. Sanchez for this. Pt denied self-harm. Pt denied SI, plan or intent.     Past Psychiatric hx: Aripiprazole, mirtazapine, paroxetine, propranolol (constipation), THC Rx, Adderall, trazodone, atomoxetine    Past Medical hx:   Past Medical History:   Diagnosis Date    Acne 9 years old    Facial trauma Acne?? Very dry skin, dry eyes    Hearing loss My fault    Iron deficiency anemia due to chronic blood loss 11/01/2022    Jaundice     birth    Mental disorder 17    ADD, im being treated for borderline personality disorder, ocd, adhd, pmdd, etc    Seasonal allergies Born    Seizures Around 9, only arpund sunlight flickering        Interim hx:  Medication changes last visit: Start a trial of fluoxetine 20 mg and aripiprazole 5 mg  Anxiety: moderate to severe  Depression: moderate     Denies suicidal/homicidal ideations.  Denies hopelessness/worthlessness.    Denies auditory/visual hallucinations      Alcohol: Infrequent use  Drug: Pt denies  Tobacco: Pt denies      Review of Systems   PSYCHIATRIC: Pertinent items are noted in the narrative.        CONSTITUTIONAL: weight stable    Past Medical, Family and Social History: The patient's past medical, family and social history have been reviewed and updated as appropriate within the electronic medical record. See encounter notes.     Current Psychiatric Medication:  fluoxetine 20 mg, aripiprazole 5 mg     Compliance: limited     Side effects: Pt denies     Risk Parameters:  Patient reports no suicidal ideation  Patient reports no homicidal ideation  Patient reports no self-injurious behavior  Patient reports no violent behavior     Exam (detailed: at least 9 elements; comprehensive: all 15 elements)   Constitutional  Vitals:  Most recent vital signs, dated less than 90 days prior to this appointment, were reviewed. BP: ()/()   Arterial Line BP: ()/()       General:  unremarkable, age appropriate, casual  attire, good eye contact, good rapport       Musculoskeletal  Muscle Strength/Tone:  no flaccidity, no tremor    Gait & Station:  normal      Psychiatric                       Speech:  normal tone, normal rate, rhythm, and volume   Mood & Affect:   Depressed, anxiety         Thought Process:   Goal directed; Linear    Associations:   intact   Thought Content:   No SI/HI, delusions, or paranoia, no AV/VH   Insight & Judgement:   Good, adequate to circumstances   Orientation:   grossly intact; alert and oriented x 4    Memory:  intact for content of interview    Language:  grossly intact, can repeat    Attention Span  : Grossly intact for content of interview   Fund of Knowledge:   intact and appropriate to age and level of education        Assessment and Diagnosis   Status/Progress: Based on the examination today, the patient's problem(s) is/are adequately controlled.  New problems have not been presented today. Co-morbidities are not complicating management of the primary condition. There are no active rule-out diagnoses for this patient at this time.      Impression: Pt presents with labile mood and has stopped taking all of her medications. MI techniques used to address ambivalence and insight-oriented techniques discussed. Encouraged pt to meet with therapist to discuss history of trauma and DBT techniques.    Diagnosis:   1) Mood disorder  2) Anxiety Disorder  3) Cannabis use disorder  4) Axis II traits  Intervention/Counseling/Treatment Plan   Medication Management:      1. fluoxetine 20 mg    2. aripiprazole 5 mg    3. Cont in therapy with Alexandria Castillo LPC    Psychotherapy: 45 minutes  Target symptoms: cannabis use, emotional dysregulation, medication ambivalence  Why chosen therapy is appropriate versus another modality: CBT used; relevant to diagnosis, patient responds to this modality  Outcome monitoring methods: self-report, observation  Therapeutic intervention type: MI techniques, coping  strategies  Topics discussed/themes: building skills sets for symptom management, symptom recognition, nutrition, exercise  The patient's response to the intervention is accepting  Patient's response to treatment is: good.   The patient's progress toward treatment goals: improving     Return to clinic: 1 weeks    -Cognitive-Behavioral/Supportive therapy and psychoeducation provided  -R/B/SE's of medications discussed with the pt who expresses understanding and chooses to take medications as prescribed.   -Pt instructed to call clinic, 911 or go to nearest emergency room if sxs worsen or pt is in   crisis. The pt expresses understanding.    Richi Francisco, PhD, MP

## 2025-01-23 NOTE — TELEPHONE ENCOUNTER
----- Message from Richi Francisco sent at 1/23/2025  4:41 PM CST -----  Regarding: apt  Pt needs a f/u virtual visit in 1 week. LIAM

## 2025-03-17 ENCOUNTER — OFFICE VISIT (OUTPATIENT)
Dept: OBSTETRICS AND GYNECOLOGY | Facility: CLINIC | Age: 20
End: 2025-03-17
Payer: COMMERCIAL

## 2025-03-17 VITALS
DIASTOLIC BLOOD PRESSURE: 64 MMHG | BODY MASS INDEX: 20.12 KG/M2 | SYSTOLIC BLOOD PRESSURE: 109 MMHG | HEIGHT: 63 IN | WEIGHT: 113.56 LBS

## 2025-03-17 DIAGNOSIS — N94.6 DYSMENORRHEA: ICD-10-CM

## 2025-03-17 DIAGNOSIS — Z30.09 ENCOUNTER FOR COUNSELING REGARDING CONTRACEPTION: Primary | ICD-10-CM

## 2025-03-17 DIAGNOSIS — Z30.9 ENCOUNTER FOR CONTRACEPTIVE MANAGEMENT, UNSPECIFIED TYPE: ICD-10-CM

## 2025-03-17 DIAGNOSIS — R30.0 DYSURIA: ICD-10-CM

## 2025-03-17 LAB
B-HCG UR QL: NEGATIVE
B-HCG UR QL: NEGATIVE
BILIRUB SERPL-MCNC: NORMAL MG/DL
BLOOD URINE, POC: NORMAL
CLARITY, POC UA: NORMAL
COLOR, POC UA: NORMAL
CTP QC/QA: YES
CTP QC/QA: YES
GLUCOSE UR QL STRIP: NORMAL
KETONES UR QL STRIP: 8
LEUKOCYTE ESTERASE URINE, POC: NORMAL
NITRITE, POC UA: NORMAL
PH, POC UA: 9
PROTEIN, POC: NORMAL
SPECIFIC GRAVITY, POC UA: 1.02
UROBILINOGEN, POC UA: NORMAL

## 2025-03-17 PROCEDURE — 99214 OFFICE O/P EST MOD 30 MIN: CPT | Mod: S$GLB,,, | Performed by: OBSTETRICS & GYNECOLOGY

## 2025-03-17 PROCEDURE — 1159F MED LIST DOCD IN RCRD: CPT | Mod: CPTII,S$GLB,, | Performed by: OBSTETRICS & GYNECOLOGY

## 2025-03-17 PROCEDURE — 87491 CHLMYD TRACH DNA AMP PROBE: CPT | Performed by: OBSTETRICS & GYNECOLOGY

## 2025-03-17 PROCEDURE — 81025 URINE PREGNANCY TEST: CPT | Mod: S$GLB,,, | Performed by: OBSTETRICS & GYNECOLOGY

## 2025-03-17 PROCEDURE — 81002 URINALYSIS NONAUTO W/O SCOPE: CPT | Mod: S$GLB,,, | Performed by: OBSTETRICS & GYNECOLOGY

## 2025-03-17 PROCEDURE — 3008F BODY MASS INDEX DOCD: CPT | Mod: CPTII,S$GLB,, | Performed by: OBSTETRICS & GYNECOLOGY

## 2025-03-17 PROCEDURE — 1160F RVW MEDS BY RX/DR IN RCRD: CPT | Mod: CPTII,S$GLB,, | Performed by: OBSTETRICS & GYNECOLOGY

## 2025-03-17 PROCEDURE — 3074F SYST BP LT 130 MM HG: CPT | Mod: CPTII,S$GLB,, | Performed by: OBSTETRICS & GYNECOLOGY

## 2025-03-17 PROCEDURE — 99999 PR PBB SHADOW E&M-EST. PATIENT-LVL III: CPT | Mod: PBBFAC,,, | Performed by: OBSTETRICS & GYNECOLOGY

## 2025-03-17 PROCEDURE — 3078F DIAST BP <80 MM HG: CPT | Mod: CPTII,S$GLB,, | Performed by: OBSTETRICS & GYNECOLOGY

## 2025-03-17 RX ORDER — MEDROXYPROGESTERONE ACETATE 150 MG/ML
150 INJECTION, SUSPENSION INTRAMUSCULAR
Qty: 1 ML | Refills: 3 | Status: SHIPPED | OUTPATIENT
Start: 2025-03-17 | End: 2025-09-14

## 2025-03-17 RX ORDER — IBUPROFEN 600 MG/1
600 TABLET ORAL EVERY 8 HOURS PRN
Qty: 60 TABLET | Refills: 2 | Status: SHIPPED | OUTPATIENT
Start: 2025-03-17 | End: 2026-03-17

## 2025-03-17 NOTE — PROGRESS NOTES
Subjective     Patient ID: Tamanna Waters is a 19 y.o. adult.    Chief Complaint:  Contraception      History of Present Illness  Gynecologic Exam  This is a recurrent problem. The current episode started in the past 7 days. The problem occurs rarely. The problem has been resolved. Associated symptoms include abdominal pain, anorexia, chills, headaches and nausea. Pertinent negatives include no chest pain, coughing, diaphoresis, fatigue, fever, rash or vomiting. The symptoms are aggravated by activity, bowel movements, heavy lifting and urinating. Tamanna has tried acetaminophen, heating pad, NSAIDs, oral narcotics and warm bath for the symptoms. The treatment provided moderate relief.     20 y/o  presents for contraception management.   She has taken OCPs in the past, had some mood changes. She had Mirena IUD for about a year, had pain the entire year so had it removed. Interested in gaining weight. Questions about keeping vaginal pH normal.    Menarche 11.     Cycles are irregular (sometimes two periods in a month sometimes skipping a month), heavy bleeding, bleeding for 7 days to a month. Associated symptoms are spasms, whole body numbness, emotional (manic symptoms), severe cramping. She sometimes takes      Reports urinary frequency recently.      GYN & OB History  No LMP recorded (lmp unknown).   Date of Last Pap: No result found    OB History    Para Term  AB Living   0 0 0 0 0 0   SAB IAB Ectopic Multiple Live Births   0 0 0 0 0       Review of Systems  Review of Systems   Constitutional:  Positive for chills. Negative for diaphoresis, fatigue and fever.   Respiratory:  Negative for cough and shortness of breath.    Cardiovascular:  Negative for chest pain and palpitations.   Gastrointestinal:  Positive for abdominal pain, anorexia and nausea. Negative for constipation, diarrhea and vomiting.   Genitourinary:  Positive for dysmenorrhea. Negative for dyspareunia, menstrual problem,  pelvic pain, vaginal bleeding, vaginal discharge and vaginal pain.   Integumentary:  Negative for rash.   Neurological:  Positive for headaches.   Psychiatric/Behavioral:  Negative for depression. The patient is not nervous/anxious.           Objective   Physical Exam:   Constitutional: Tamanna appears well-developed and well-nourished. No distress.    HENT:   Head: Normocephalic and atraumatic.       Pulmonary/Chest: Effort normal.                      Neurological: Tamanna is alert.     Psychiatric: Tamanna has a normal mood and affect. Tamanna's behavior is normal. Judgment and thought content normal.            Assessment and Plan     1. Encounter for counseling regarding contraception    2. Dysmenorrhea    3. Dysuria    4. Encounter for contraceptive management, unspecified type            Plan:  Tamanna was seen today for contraception.    Diagnoses and all orders for this visit:    Encounter for counseling regarding contraception  -     POCT urine pregnancy  -     POCT urine pregnancy    Dysmenorrhea  -     ibuprofen (ADVIL,MOTRIN) 600 MG tablet; Take 1 tablet (600 mg total) by mouth every 8 (eight) hours as needed for Pain.    Dysuria  -     C. trachomatis/N. gonorrhoeae by AMP DNA  -     POCT URINE DIPSTICK WITHOUT MICROSCOPE    Encounter for contraceptive management, unspecified type  -     medroxyPROGESTERone (DEPO-PROVERA) 150 mg/mL injection; Inject 1 mL (150 mg total) into the muscle every 3 (three) months. for 3 doses    UPT neg  Urine dip neg  GCCT urine sent  Contraception options discussed including OCPs, Depo Provera, vaginal ring, hormone patch, IUD, Nexplanon. Patient intereseted in Depo Provera. Reviewed risks associated with Depo Provera including weight gain, decreased bone density.  Scheduled NSAIDs (ibuprofen 600-800 mg every 8 hours as needed for pain).   Discussed given pain symptoms with menses, would consider endometriosis as a possible diagnosis, but this is a surgical diagnosis.      Orders Placed This Encounter   Procedures    C. trachomatis/N. gonorrhoeae by AMP DNA    POCT urine pregnancy    POCT URINE DIPSTICK WITHOUT MICROSCOPE    POCT urine pregnancy       Follow up if symptoms worsen or fail to improve.

## 2025-03-18 ENCOUNTER — HOSPITAL ENCOUNTER (OUTPATIENT)
Dept: RADIOLOGY | Facility: HOSPITAL | Age: 20
Discharge: HOME | End: 2025-03-18
Attending: INTERNAL MEDICINE
Payer: COMMERCIAL

## 2025-03-18 ENCOUNTER — OFFICE VISIT (OUTPATIENT)
Dept: FAMILY MEDICINE | Facility: CLINIC | Age: 20
End: 2025-03-18
Payer: COMMERCIAL

## 2025-03-18 VITALS
HEIGHT: 63 IN | WEIGHT: 114 LBS | HEART RATE: 83 BPM | DIASTOLIC BLOOD PRESSURE: 68 MMHG | BODY MASS INDEX: 20.2 KG/M2 | OXYGEN SATURATION: 99 % | SYSTOLIC BLOOD PRESSURE: 94 MMHG

## 2025-03-18 DIAGNOSIS — G89.29 CHRONIC MIDLINE LOW BACK PAIN WITHOUT SCIATICA: ICD-10-CM

## 2025-03-18 DIAGNOSIS — G89.29 CHRONIC MIDLINE LOW BACK PAIN WITHOUT SCIATICA: Primary | ICD-10-CM

## 2025-03-18 DIAGNOSIS — M54.50 CHRONIC MIDLINE LOW BACK PAIN WITHOUT SCIATICA: Primary | ICD-10-CM

## 2025-03-18 DIAGNOSIS — M54.50 CHRONIC MIDLINE LOW BACK PAIN WITHOUT SCIATICA: ICD-10-CM

## 2025-03-18 LAB
C TRACH DNA SPEC QL NAA+PROBE: NOT DETECTED
N GONORRHOEA DNA SPEC QL NAA+PROBE: NOT DETECTED

## 2025-03-18 PROCEDURE — 72070 X-RAY EXAM THORAC SPINE 2VWS: CPT | Mod: 26,,, | Performed by: RADIOLOGY

## 2025-03-18 PROCEDURE — 72070 X-RAY EXAM THORAC SPINE 2VWS: CPT | Mod: TC,FY,PO

## 2025-03-18 PROCEDURE — 3074F SYST BP LT 130 MM HG: CPT | Mod: CPTII,S$GLB,, | Performed by: INTERNAL MEDICINE

## 2025-03-18 PROCEDURE — 72110 X-RAY EXAM L-2 SPINE 4/>VWS: CPT | Mod: 26,,, | Performed by: RADIOLOGY

## 2025-03-18 PROCEDURE — 72110 X-RAY EXAM L-2 SPINE 4/>VWS: CPT | Mod: TC,FY,PO

## 2025-03-18 PROCEDURE — 99213 OFFICE O/P EST LOW 20 MIN: CPT | Mod: S$GLB,,, | Performed by: INTERNAL MEDICINE

## 2025-03-18 PROCEDURE — 1160F RVW MEDS BY RX/DR IN RCRD: CPT | Mod: CPTII,S$GLB,, | Performed by: INTERNAL MEDICINE

## 2025-03-18 PROCEDURE — 3008F BODY MASS INDEX DOCD: CPT | Mod: CPTII,S$GLB,, | Performed by: INTERNAL MEDICINE

## 2025-03-18 PROCEDURE — 99999 PR PBB SHADOW E&M-EST. PATIENT-LVL III: CPT | Mod: PBBFAC,,, | Performed by: INTERNAL MEDICINE

## 2025-03-18 PROCEDURE — 1159F MED LIST DOCD IN RCRD: CPT | Mod: CPTII,S$GLB,, | Performed by: INTERNAL MEDICINE

## 2025-03-18 PROCEDURE — 3078F DIAST BP <80 MM HG: CPT | Mod: CPTII,S$GLB,, | Performed by: INTERNAL MEDICINE

## 2025-03-18 NOTE — PROGRESS NOTES
"[facility]  Primary [facility]   [address]       Patient ID: Tamanna Waters     Chief Complaint:   Chief Complaint   Patient presents with    Back Pain     Patient states pain starts in lower back and feel like a soreness. Pt states she feels like it's bruised. Pt states pain radiates down into legs and feels like pins and needles.        HPI:  Patient complains of chronic lower back pain since high school which is been for quite a few years but now she is starting to have more superior pain in her thoracic spine.  She says that her mother was massaging that area today and I felt sore.  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  For now I want her to make sure she has good lumbar support in her car seat or biotin insert.  I would also like her to look up yoga on you tube and do some stretching and strengthening/toning exercises daily.  I am also going to get some x-rays of her thoracic and lumbar spine and consider a steroid pack depending on what I see.  She does not have any alarm symptoms just the chronicity of these problems are most concerning.    Review of Systems      Lumbago     Objective:      Physical Exam   Physical Exam       Decreased Range of Motion in lower back     Vitals:   Vitals:    03/18/25 1406   BP: 94/68   Pulse: 83   SpO2: 99%   Weight: 51.7 kg (113 lb 15.7 oz)   Height: 5' 3" (1.6 m)        Assessment:           Plan:       Tamanna Waters  was seen today for follow-up and may need lab work.    Diagnoses and all orders for this visit:    Tamanna was seen today for back pain.    Diagnoses and all orders for this visit:    Chronic midline low back pain without sciatica  -     X-Ray Lumbar Spine 5 View; Future  -     X-Ray Thoracic Spine AP Lateral; Future  Consider Medrol Dose Pack          Richi Little MD    Answers submitted by the " patient for this visit:  Back Pain Questionnaire (Submitted on 3/18/2025)  Chief Complaint: Back pain  Chronicity: chronic  Onset: more than 1 year ago  Frequency: daily  Progression since onset: waxing and waning  Pain location: gluteal, lumbar spine, sacro-iliac  Pain quality: aching, cramping, shooting, stabbing  Radiates to: left knee, left thigh, right knee, right thigh  Pain - numeric: 8/10  Pain is: worse during the day  Aggravated by: bending, coughing, position, stress, urinating  Stiffness is present: all day  bladder incontinence: Yes  bowel incontinence: No  leg pain: Yes  paresis: No  paresthesias: Yes  perianal numbness: No  tingling: Yes  weight loss: Yes  genital pain: Yes  Pain severity: moderate  Improvement on treatment: mild

## 2025-03-19 ENCOUNTER — RESULTS FOLLOW-UP (OUTPATIENT)
Dept: OBSTETRICS AND GYNECOLOGY | Facility: CLINIC | Age: 20
End: 2025-03-19

## 2025-03-19 ENCOUNTER — RESULTS FOLLOW-UP (OUTPATIENT)
Dept: FAMILY MEDICINE | Facility: CLINIC | Age: 20
End: 2025-03-19

## 2025-03-31 ENCOUNTER — OFFICE VISIT (OUTPATIENT)
Dept: URGENT CARE | Facility: CLINIC | Age: 20
End: 2025-03-31
Payer: COMMERCIAL

## 2025-03-31 VITALS
HEART RATE: 104 BPM | HEIGHT: 65 IN | OXYGEN SATURATION: 98 % | BODY MASS INDEX: 18.66 KG/M2 | WEIGHT: 112 LBS | DIASTOLIC BLOOD PRESSURE: 64 MMHG | SYSTOLIC BLOOD PRESSURE: 106 MMHG | RESPIRATION RATE: 17 BRPM | TEMPERATURE: 98 F

## 2025-03-31 DIAGNOSIS — R05.9 COUGH, UNSPECIFIED TYPE: ICD-10-CM

## 2025-03-31 DIAGNOSIS — J06.9 VIRAL URI WITH COUGH: Primary | ICD-10-CM

## 2025-03-31 LAB
CTP QC/QA: YES
MOLECULAR STREP A: NEGATIVE
POC MOLECULAR INFLUENZA A AGN: NEGATIVE
POC MOLECULAR INFLUENZA B AGN: NEGATIVE
SARS CORONAVIRUS 2 ANTIGEN: NEGATIVE

## 2025-03-31 PROCEDURE — 87651 STREP A DNA AMP PROBE: CPT | Mod: QW,S$GLB,, | Performed by: EMERGENCY MEDICINE

## 2025-03-31 PROCEDURE — 99214 OFFICE O/P EST MOD 30 MIN: CPT | Mod: S$GLB,,, | Performed by: EMERGENCY MEDICINE

## 2025-03-31 PROCEDURE — 87502 INFLUENZA DNA AMP PROBE: CPT | Mod: QW,S$GLB,, | Performed by: EMERGENCY MEDICINE

## 2025-03-31 PROCEDURE — 87811 SARS-COV-2 COVID19 W/OPTIC: CPT | Mod: QW,S$GLB,, | Performed by: EMERGENCY MEDICINE

## 2025-03-31 RX ORDER — AZELASTINE 1 MG/ML
1 SPRAY, METERED NASAL 2 TIMES DAILY PRN
Qty: 30 ML | Refills: 0 | Status: SHIPPED | OUTPATIENT
Start: 2025-03-31 | End: 2025-04-30

## 2025-03-31 RX ORDER — CHLOPHEDIANOL HCL AND PYRILAMINE MALEATE 12.5; 12.5 MG/5ML; MG/5ML
10 SOLUTION ORAL 3 TIMES DAILY PRN
Qty: 180 ML | Refills: 0 | Status: SHIPPED | OUTPATIENT
Start: 2025-03-31

## 2025-04-01 ENCOUNTER — TELEPHONE (OUTPATIENT)
Dept: PSYCHIATRY | Facility: CLINIC | Age: 20
End: 2025-04-01
Payer: COMMERCIAL

## 2025-04-01 NOTE — PROGRESS NOTES
"Subjective:      Patient ID: Tamanna Waters is a 19 y.o. adult.    Vitals:  height is 5' 5" (1.651 m) and weight is 50.8 kg (112 lb). Tamanna's oral temperature is 98.3 °F (36.8 °C). Tamanna's blood pressure is 106/64 and Tamanna's pulse is 104. Tamanna's respiration is 17 and oxygen saturation is 98%.     Chief Complaint: Cough (Symptoms started 1 day ago . Symptoms are the following: cough w/ mucus, sore throat, headache, bodyache, nausea, , diarrhea, chills, sweats, nasal congestion. Symptoms treated with sinus headache, ibuprofen last dose 30 minutes ago. )    This is a 19 y.o. adult who presents today with a chief complaint of Cough: Symptoms started 1 day ago . Symptoms are the following: cough w/ mucus, sore throat, headache, bodyache, nausea, diarrhea, chills, sweats, nasal congestion. No measured fever. Siblings sick with similar and live in household with her. Symptoms treated with sinus headache medication, ibuprofen last dose 30 minutes ago.              Cough  This is a new problem. The current episode started yesterday. The problem has been gradually worsening. The problem occurs constantly. The cough is Productive of sputum. Associated symptoms include chills, headaches, nasal congestion, postnasal drip, a sore throat and sweats. Pertinent negatives include no chest pain, ear pain, eye redness, fever, heartburn, hemoptysis, myalgias, rash or wheezing. Associated symptoms comments: Bodyache, nausea. Treatments tried: ibuprofen, sinus headache. The treatment provided mild relief. Naders past medical history is significant for pneumonia.       Constitution: Positive for chills, sweating and fatigue. Negative for fever and unexpected weight change.   HENT:  Positive for postnasal drip and sore throat. Negative for ear pain, drooling, congestion, trouble swallowing and voice change.    Neck: Negative for neck pain, neck stiffness, painful lymph nodes and neck swelling.   Cardiovascular:  Negative " for chest pain, leg swelling, palpitations, sob on exertion and passing out.   Eyes:  Negative for eye pain, eye redness, photophobia, double vision and blurred vision.   Respiratory:  Positive for cough. Negative for chest tightness, sputum production, bloody sputum, stridor and wheezing.    Gastrointestinal:  Negative for abdominal pain, abdominal bloating, nausea, vomiting, constipation, diarrhea and heartburn.   Musculoskeletal:  Negative for joint pain, joint swelling, back pain, muscle cramps and muscle ache.   Skin:  Negative for rash and hives.   Allergic/Immunologic: Negative for hives and itching.   Neurological:  Positive for headaches. Negative for dizziness, light-headedness, passing out, loss of balance, altered mental status, loss of consciousness and seizures.   Hematologic/Lymphatic: Negative for swollen lymph nodes.   Psychiatric/Behavioral:  Negative for altered mental status and nervous/anxious. The patient is not nervous/anxious.       Objective:     Physical Exam   Constitutional: Tamanna is oriented to person, place, and time. Tamanna appears well-developed. Tamanna is cooperative.  Non-toxic appearance. Tamanna does not appear ill. No distress.   HENT:   Head: Normocephalic and atraumatic.   Ears:   Right Ear: Hearing, tympanic membrane, external ear and ear canal normal.   Left Ear: Hearing, tympanic membrane, external ear and ear canal normal.   Nose: Rhinorrhea (clear) and congestion (moderate) present. No mucosal edema or nasal deformity. No epistaxis. Right sinus exhibits no maxillary sinus tenderness and no frontal sinus tenderness. Left sinus exhibits no maxillary sinus tenderness and no frontal sinus tenderness.   Mouth/Throat: Uvula is midline, oropharynx is clear and moist and mucous membranes are normal. Mucous membranes are moist. No trismus in the jaw. Normal dentition. No uvula swelling. No oropharyngeal exudate, posterior oropharyngeal edema or posterior oropharyngeal erythema.    Eyes: Conjunctivae and lids are normal. No scleral icterus.   Neck: Trachea normal and phonation normal. Neck supple. No edema present. No erythema present. No neck rigidity present.   Cardiovascular: Normal rate, regular rhythm, normal heart sounds and normal pulses.   No murmur heard.  Pulmonary/Chest: Effort normal and breath sounds normal. No respiratory distress. Tamanna has no decreased breath sounds. Tamanna has no wheezes. Tamanna has no rhonchi. Tamanna has no rales.   Abdominal: Normal appearance.   Musculoskeletal: Normal range of motion.         General: No deformity. Normal range of motion.   Neurological: Tamanna is alert and oriented to person, place, and time. Tamanna exhibits normal muscle tone. Coordination normal.   Skin: Skin is warm, dry, intact, not diaphoretic and not pale.   Psychiatric: Tamanna's speech is normal and behavior is normal. Judgment and thought content normal.   Nursing note and vitals reviewed.    Results for orders placed or performed in visit on 03/31/25   POCT Strep A, Molecular    Collection Time: 03/31/25  7:39 PM   Result Value Ref Range    Molecular Strep A, POC Negative Negative     Acceptable Yes    SARS Coronavirus 2 Antigen, POCT Manual Read    Collection Time: 03/31/25  7:40 PM   Result Value Ref Range    SARS Coronavirus 2 Antigen Negative Negative, Presumptive Negative     Acceptable Yes    POCT Influenza A/B MOLECULAR    Collection Time: 03/31/25  7:40 PM   Result Value Ref Range    POC Molecular Influenza A Ag Negative Negative    POC Molecular Influenza B Ag Negative Negative     Acceptable Yes         Assessment:     1. Viral URI with cough    2. Cough, unspecified type        Plan:     Neg COVID/flu/strep.   Suspect viral URI at this time.     Rest. Fluids.     Vitamin D, C, zinc.     Recheck as needed.     Viral URI with cough  -     pyrilamine-chlophedianoL (NINJACOF) 12.5-12.5 mg/5 mL Liqd; Take 10 mLs by  mouth 3 (three) times daily as needed (cough).  Dispense: 180 mL; Refill: 0  -     azelastine (ASTELIN) 137 mcg (0.1 %) nasal spray; 1 spray (137 mcg total) by Nasal route 2 (two) times daily as needed for Rhinitis (nasal congestion).  Dispense: 30 mL; Refill: 0    Cough, unspecified type  -     SARS Coronavirus 2 Antigen, POCT Manual Read  -     POCT Influenza A/B MOLECULAR  -     POCT Strep A, Molecular      Patient Instructions   Rest.     Increase fluid intake.     Consider vitamin D, C, and zinc for immune support.     Ok to continue Ibuprofen or Tylenol as needed for chills, body aches, or any pain.     Recheck with primary doctor if not improving.     You must understand that you've received an Urgent Care treatment only and that you may be released before all your medical problems are known or treated. You, the patient, will arrange for follow up care as instructed.    Follow up with your PCP or specialty clinic as directed if not improved or as needed. You can call [phone] to schedule an appointment with the appropriate provider.      You, the patient, will arrange for follow up care as instructed.     If your condition worsens or fails to improve we recommend that you receive another evaluation at the ER immediately or contact your PCP to discuss your concerns.     Patient aware of treatment plan and verbalized understanding.

## 2025-04-01 NOTE — PATIENT INSTRUCTIONS
Rest.     Increase fluid intake.     Consider vitamin D, C, and zinc for immune support.     Ok to continue Ibuprofen or Tylenol as needed for chills, body aches, or any pain.     Recheck with primary doctor if not improving.     You must understand that you've received an Urgent Care treatment only and that you may be released before all your medical problems are known or treated. You, the patient, will arrange for follow up care as instructed.    Follow up with your PCP or specialty clinic as directed if not improved or as needed. You can call [phone] to schedule an appointment with the appropriate provider.      You, the patient, will arrange for follow up care as instructed.     If your condition worsens or fails to improve we recommend that you receive another evaluation at the ER immediately or contact your PCP to discuss your concerns.     Patient aware of treatment plan and verbalized understanding.

## 2025-04-01 NOTE — PROGRESS NOTES
"The patient location is:  [place], [place]  The patient location [place] is: [place]  The patient phone number is: [phone]  Visit type: Virtual visit with synchronous audio and video  Each patient to whom he or she provides medical services by telemedicine is:  (1) informed of the relationship between the physician and patient and the respective role of any other health care provider with respect to management of the patient; and (2) notified that he or she may decline to receive medical services by telemedicine and may withdraw from such care at any time.     Outpatient Psychiatry Follow-Up Visit    Clinical Status of Patient: Outpatient (Ambulatory)  04/01/2025     Chief Complaint:  presenting today for a follow-up.       Interval History and Content of Current Session:  Interim Events/Subjective Report/Content of Current Session:  follow-up appointment.    Pt is a 17 y/o female with past psychiatric hx of mood disorder, anxiety, cannabis abuse who presents for follow-up treatment. Pt reported     Has started seeing a therapist Abhay Peña LCSW with [facility].    History of Present Illness    CHIEF COMPLAINT:  Patient presents with worsening mood, including anger, depression, and suicidal ideation, following a recent argument with their parents and a therapy session.    HPI:  Patient reports feeling angry, depressed, and unmotivated. They describe having trouble sleeping and feeling that their family is disappointed in them, and experiencing suicidal thoughts. They mention losing all motivation and purpose, feeling like they are "not here anymore" and just "walking around living because I have to."    Patient had a significant argument with their parents today, which contributed to their emotional distress. They also attended their first therapy session with a new therapist. Patient expresses feeling targeted and bullied, particularly in interactions with their parents.    Patient discusses ongoing emotional " "attachment to an ex-girlfriend, despite the relationship ending a year and a half ago. They report constantly thinking about their ex, dreaming about her, and being reminded of her by songs and shows. This attachment is causing distress and affecting their current relationship.    Patient describes difficulty with emotional regulation, experiencing intense emotions that feel like "life and death" situations. They report impatience and anger when unable to get immediate help or responses from others. Patient also mentions difficulty sitting with silence or being alone without distractions.    Patient expresses a negative self-image, stating they don't like themselves and feel they have lost all their skills. They report difficulty finding motivation for activities that require effort, questioning the point of trying when they don't value themselves enough. Patient denies current self-harm behaviors such as cutting or burning themselves. Patient denies having a specific plan for suicide.    Past Psychiatric hx: Aripiprazole, mirtazapine, paroxetine, propranolol (constipation), THC Rx, Adderall, trazodone, atomoxetine    Past Medical hx:   Past Medical History:   Diagnosis Date    Acne 9 years old    Facial trauma Acne?? Very dry skin, dry eyes    Hearing loss My fault    Iron deficiency anemia due to chronic blood loss 11/01/2022    Jaundice     birth    Mental disorder 17    ADD, im being treated for borderline personality disorder, ocd, adhd, pmdd, etc    Seasonal allergies Born    Seizures Around 9, only arpund sunlight flickering        Interim hx:  Medication changes last visit: none  Anxiety: moderate to severe  Depression: moderate     Denies suicidal/homicidal ideations.  Denies hopelessness/worthlessness.    Denies auditory/visual hallucinations      Alcohol: Infrequent use  Drug: Pt denies  Tobacco: Pt denies      Review of Systems   PSYCHIATRIC: Pertinent items are noted in the narrative.        " CONSTITUTIONAL: weight stable    Past Medical, Family and Social History: The patient's past medical, family and social history have been reviewed and updated as appropriate within the electronic medical record. See encounter notes.     Current Psychiatric Medication:  none     Compliance: limited     Side effects: Pt denies     Risk Parameters:  Patient reports no suicidal ideation  Patient reports no homicidal ideation  Patient reports no self-injurious behavior  Patient reports no violent behavior     Exam (detailed: at least 9 elements; comprehensive: all 15 elements)   Constitutional  Vitals:  Most recent vital signs, dated less than 90 days prior to this appointment, were reviewed. BP: ()/()   Arterial Line BP: ()/()       General:  unremarkable, age appropriate, casual attire, good eye contact, good rapport       Musculoskeletal  Muscle Strength/Tone:  no flaccidity, no tremor    Gait & Station:  normal      Psychiatric                       Speech:  normal tone, normal rate, rhythm, and volume   Mood & Affect:   Depressed, anxiety         Thought Process:   Goal directed; Linear    Associations:   intact   Thought Content:   No SI/HI, delusions, or paranoia, no AV/VH   Insight & Judgement:   Good, adequate to circumstances   Orientation:   grossly intact; alert and oriented x 4    Memory:  intact for content of interview    Language:  grossly intact, can repeat    Attention Span  : Grossly intact for content of interview   Fund of Knowledge:   intact and appropriate to age and level of education        Assessment and Diagnosis   Status/Progress: Based on the examination today, the patient's problem(s) is/are adequately controlled.  New problems have not been presented today. Co-morbidities are not complicating management of the primary condition. There are no active rule-out diagnoses for this patient at this time.      Impression: Pt continues to present with labile mood and continues to refuse all  medications. CBT and psychoeducation provided. Continues to strongly encourage therapy and discussed possibility of IOP programs for DBT treatment.     Diagnosis:   1) Mood disorder  2) Anxiety Disorder  3) Cannabis use disorder  4) Borderline Personality Disorder  Intervention/Counseling/Treatment Plan   Medication Management:      1. Cont in therapy with Abhay Peña LCSW with [facility] or transfer to a specialist in DBT    2. Call to report any worsening of symptoms or problems with the medication. Pt instructed to go to ER with thoughts of harming self, others    Psychotherapy: 45 minutes  Target symptoms: cannabis use, emotional dysregulation, medication ambivalence  Why chosen therapy is appropriate versus another modality: CBT used; relevant to diagnosis, patient responds to this modality  Outcome monitoring methods: self-report, observation  Therapeutic intervention type: MI techniques, coping strategies  Topics discussed/themes: building skills sets for symptom management, symptom recognition, nutrition, exercise  The patient's response to the intervention is accepting  Patient's response to treatment is: good.   The patient's progress toward treatment goals: improving     Return to clinic: as needed    -Cognitive-Behavioral/Supportive therapy and psychoeducation provided  -R/B/SE's of medications discussed with the pt who expresses understanding and chooses to take medications as prescribed.   -Pt instructed to call clinic, 911 or go to nearest emergency room if sxs worsen or pt is in   crisis. The pt expresses understanding.    Richi Francisco, PhD, MP

## 2025-04-01 NOTE — TELEPHONE ENCOUNTER
Called patient and scheduled her for missed January follow up on 04/02/2025 virtual @  4 PM ext virtual

## 2025-04-02 ENCOUNTER — OFFICE VISIT (OUTPATIENT)
Dept: PSYCHIATRY | Facility: CLINIC | Age: 20
End: 2025-04-02
Payer: COMMERCIAL

## 2025-04-02 DIAGNOSIS — F12.20 CANNABIS USE DISORDER, SEVERE, DEPENDENCE: ICD-10-CM

## 2025-04-02 DIAGNOSIS — F41.9 ANXIETY: ICD-10-CM

## 2025-04-02 DIAGNOSIS — F39 MOOD DISORDER: Primary | ICD-10-CM

## 2025-04-15 NOTE — PROGRESS NOTES
"Subjective:       Patient ID: Tamanna Waters is a 11 y.o. female.    Vitals:  height is 4' 10" (1.473 m) and weight is 37.2 kg (82 lb). Her tympanic temperature is 97.1 °F (36.2 °C). Her blood pressure is 97/62 and her pulse is 103. Her respiration is 16 and oxygen saturation is 99%.     Chief Complaint: Cough (started yesterday.)    Cough   This is a new problem. The current episode started yesterday. The problem has been gradually worsening. The problem occurs constantly. The cough is productive of sputum. Associated symptoms include headaches, nasal congestion and a sore throat. Pertinent negatives include no ear pain or fever. Nothing aggravates the symptoms. She has tried nothing for the symptoms. The treatment provided no relief. There is no history of asthma.     Review of Systems   Constitution: Negative for fever.   HENT: Positive for sore throat. Negative for ear pain.    Respiratory: Positive for cough.    Neurological: Positive for headaches.       Objective:      Physical Exam   Constitutional: She appears well-nourished. No distress.   HENT:   Right Ear: Tympanic membrane normal.   Left Ear: Tympanic membrane normal.   Nose: Nose normal. No nasal discharge.   Mouth/Throat: No dental caries. No tonsillar exudate. Pharynx is normal.   Cardiovascular: Regular rhythm.    Pulmonary/Chest: Effort normal and breath sounds normal.   Abdominal: Soft.   Neurological: She is alert.   Skin: No rash noted.       Assessment:       1. Sore throat        Plan:         Sore throat  -     POCT rapid strep A    strep neg  " Well Adult Note  Name: Christine Hargrove Today’s Date: 4/15/2025   MRN: [id number] Sex: Female   Age: 39 y.o. Ethnicity: Non- / Non    : 1986 Race: White (non-)      Christine Hargrove is here for a well adult exam.          Assessment & Plan  1. Abdominal pain.  - Reports ongoing abdominal pain following a recent visit to urgent care, where she was diagnosed with an upper respiratory infection and a stomach bug.  - No current evidence of blood in her urine.  - Comprehensive set of laboratory tests ordered, including thyroid function, cholesterol levels, metabolic panel, CBC, and screening for hepatitis C and HIV.  - If any abnormalities are detected in the lab results, further investigation will be warranted.    2. Burping.  - Experiencing burping for the past 2 days, which may be indicative of reflux or acid-related issues.  - Heart sounds are normal, lungs are clear, and strength is even upon physical examination.  - Advised to take Tums to see if it alleviates the symptoms.  - If symptoms persist, further evaluation may be necessary.    Routine medical exam  -     TSH reflex to FT4, FT3; Future  -     Lipid, Fasting; Future  -     Comprehensive Metabolic Panel; Future  -     CBC with Auto Differential; Future  Screening for HIV without presence of risk factors  -     HIV Screen; Future  Need for hepatitis C screening test  -     Hepatitis C Antibody; Future  Encounter for screening mammogram for malignant neoplasm of breast  -     ADRIANA DIGITAL SCREEN W OR WO CAD BILATERAL; Future  Encounter for well adult exam without abnormal findings    Results         No follow-ups on file.     Subjective   History of Present Illness  The patient presents for evaluation of abdominal pain and burping.    Resolution of previously experienced flank pain is reported. However, persistent abdominal discomfort is noted. Medical attention was sought at an urgent care facility on 2025, where an upper

## 2025-04-20 ENCOUNTER — PATIENT MESSAGE (OUTPATIENT)
Dept: PSYCHIATRY | Facility: CLINIC | Age: 20
End: 2025-04-20
Payer: COMMERCIAL

## 2025-04-22 ENCOUNTER — CLINICAL SUPPORT (OUTPATIENT)
Dept: PSYCHIATRY | Facility: CLINIC | Age: 20
End: 2025-04-22
Payer: COMMERCIAL

## 2025-04-22 DIAGNOSIS — F41.1 GAD (GENERALIZED ANXIETY DISORDER): ICD-10-CM

## 2025-04-22 DIAGNOSIS — F39 MOOD DISORDER: Primary | ICD-10-CM

## 2025-04-22 DIAGNOSIS — F33.0 MDD (MAJOR DEPRESSIVE DISORDER), RECURRENT EPISODE, MILD: ICD-10-CM

## 2025-04-22 DIAGNOSIS — F12.20 CANNABIS USE DISORDER, SEVERE, DEPENDENCE: ICD-10-CM

## 2025-04-22 PROCEDURE — 99999 PR PBB SHADOW E&M-EST. PATIENT-LVL I: CPT | Mod: PBBFAC,,, | Performed by: COUNSELOR

## 2025-04-23 NOTE — PROGRESS NOTES
"Individual Psychotherapy (PhD/LCSW)    4/22/2025    Site:  [facility]         Therapeutic Intervention: Met with patient.  Outpatient - Insight oriented psychotherapy 60 min - CPT code 59978, Outpatient - Behavior modifying psychotherapy 60 min - CPT code 17717, and Outpatient - Supportive psychotherapy 60 min - CPT Code 45285    Chief complaint/reason for encounter: addictive disorder, attention deficit, depression, mood swings, anxiety, behavior, and Borderline personality disorder             Interval history and content of current session: Patient reported for an in clinic follow up session.   Patient denied SI, HI or self-harm.  She reported that she is "all over the place" with her emotions. Affect congruent with mood which was reported as depressed and discouraged.  Patient reported ongoing stress related to repeated involvement in unhealthy romantic relationships. Stated, I keep ending up with people who treat me badly.   Described past relationships as emotionally manipulative, controlling and disrespectful.  Patient continues to express feeling "not good enough" and fears being alone.  She has returned to  school after taking a break to take care of her mental health.  She is currently not working because it was too stressful.  She continues to live at home with her parents.  Discussed patient's pattern of codependency and toxic relationships rooted in low self-esteem, people pleasing and fear of abandonment.  Also revisited discussion on formative relational experiences with her parents.   Focus is on building self-worth, CBT techniques to address negative core beliefs and cognitive distortions. Introduced again the idea  of joining a DBT group, but patient reported that she is not interested.  She reported using THC to numb and avoid her feelings.  Psycho education on addiction was provided.  Patient will return as scheduled.     Treatment plan:  Target symptoms: depression, distractability, lack of focus, " anxiety , mood disorder  Why chosen therapy is appropriate versus another modality: relevant to diagnosis, patient responds to this modality, evidence based practice  Outcome monitoring methods: self-report, observation  Therapeutic intervention type: insight oriented psychotherapy, behavior modifying psychotherapy, supportive psychotherapy    Risk parameters:  Patient reports no suicidal ideation  Patient reports no homicidal ideation  Patient reports no self-injurious behavior  Patient reports no violent behavior    Verbal deficits: None    Patient's response to intervention:  The patient's response to intervention is accepting.    Progress toward goals and other mental status changes:  The patient's progress toward goals is poor.    Diagnosis:     ICD-10-CM ICD-9-CM   1. Mood disorder  F39 296.90   2. LINO (generalized anxiety disorder)  F41.1 300.02   3. MDD (major depressive disorder), recurrent episode, mild  F33.0 296.31   4. Cannabis use disorder, severe, dependence  F12.20 304.30       Plan:  individual psychotherapy Pt to go to ED or call 911 if symptoms worsen or if Tamanna has thoughts of harming self and/or others. Pt verbalized understanding.    Return to clinic: as scheduled    Length of Service (minutes): 60      Each patient to whom he or she provides medical services by telemedicine is: (1) informed of the relationship between the physician and patient and the respective role of any other health care provider with respect to management of the patient; and (2) notified that he or she may decline to receive medical services by telemedicine and may withdraw from such care at any time.

## 2025-04-28 ENCOUNTER — CLINICAL SUPPORT (OUTPATIENT)
Dept: PSYCHIATRY | Facility: CLINIC | Age: 20
End: 2025-04-28
Payer: COMMERCIAL

## 2025-04-28 DIAGNOSIS — F41.1 GAD (GENERALIZED ANXIETY DISORDER): Primary | ICD-10-CM

## 2025-04-28 DIAGNOSIS — F39 MOOD DISORDER: ICD-10-CM

## 2025-04-28 DIAGNOSIS — F12.20 CANNABIS USE DISORDER, SEVERE, DEPENDENCE: ICD-10-CM

## 2025-04-28 DIAGNOSIS — F33.0 MDD (MAJOR DEPRESSIVE DISORDER), RECURRENT EPISODE, MILD: ICD-10-CM

## 2025-04-28 DIAGNOSIS — F98.8 ATTENTION DEFICIT DISORDER (ADD) WITHOUT HYPERACTIVITY: ICD-10-CM

## 2025-04-28 DIAGNOSIS — F17.200 NICOTINE DEPENDENCE DUE TO VAPING NON-TOBACCO PRODUCT: ICD-10-CM

## 2025-04-28 DIAGNOSIS — F51.02 INSOMNIA DUE TO PSYCHOLOGICAL STRESS: ICD-10-CM

## 2025-04-28 PROCEDURE — 99999 PR PBB SHADOW E&M-EST. PATIENT-LVL I: CPT | Mod: PBBFAC,,, | Performed by: COUNSELOR

## 2025-04-28 PROCEDURE — 90837 PSYTX W PT 60 MINUTES: CPT | Mod: S$GLB,,, | Performed by: COUNSELOR

## 2025-04-29 NOTE — PROGRESS NOTES
Individual Psychotherapy (PhD/LCSW)    4/28/2025    Site:  [facility]         Therapeutic Intervention: Met with patient.  Outpatient - Insight oriented psychotherapy 60 min - CPT code 90787, Outpatient - Behavior modifying psychotherapy 60 min - CPT code 93284, and Outpatient - Supportive psychotherapy 60 min - CPT Code 78940    Chief complaint/reason for encounter: attention deficit, depression, mood swings, anxiety, sleep, and THC abuse             Interval history and content of current session: Patient presented for an in clinic follow up session.  Patient denied SI, HI and self-harm.  Patient was tearful.  She reported that living with her current boyfriend has become too stressful.  She reported that he is dismissive and closed off and she needs him to communicate his feeling more. Patient continues the narrative that everyone lies  to her and uses her for sex.   Provider reviewed patient's disclosure that she approached him, initiated the relationship and invited him to live with her.   She reported being upset because he went to spend time with a male friend and felt neglected despite the fact she told him she didn't want a relationship anymore. Patient reported that she doesn't feel adequate enough for any relationship right now.  Despite provider's efforts to focus on her strengths and the positive things happening in patient's life, patient insisted that she is failure to herself and others.  Patient reported that no one understands her.  She is taking medications, but reported very little improvement in mood.  Encouraged patient to discuss with her prescriber.  She will return as scheduled.     Treatment plan:  Target symptoms: depression, distractability, lack of focus, anxiety , substance abuse, mood disorder  Why chosen therapy is appropriate versus another modality: relevant to diagnosis, patient responds to this modality, evidence based practice  Outcome monitoring methods: self-report,  observation  Therapeutic intervention type: insight oriented psychotherapy, behavior modifying psychotherapy, supportive psychotherapy    Risk parameters:  Patient reports no suicidal ideation  Patient reports no homicidal ideation  Patient reports no self-injurious behavior  Patient reports no violent behavior    Verbal deficits: None    Patient's response to intervention:  The patient's response to intervention is accepting.    Progress toward goals and other mental status changes:  The patient's progress toward goals is poor.    Diagnosis:     ICD-10-CM ICD-9-CM   1. LINO (generalized anxiety disorder)  F41.1 300.02   2. MDD (major depressive disorder), recurrent episode, mild  F33.0 296.31   3. Cannabis use disorder, severe, dependence  F12.20 304.30   4. Mood disorder  F39 296.90   5. Nicotine dependence due to vaping non-tobacco product  F17.200 305.1   6. Insomnia due to psychological stress  F51.02 307.41   7. Attention deficit disorder (ADD) without hyperactivity  F98.8 314.00       Plan:  individual psychotherapy Pt to go to ED or call 911 if symptoms worsen or if Tamanna has thoughts of harming self and/or others. Pt verbalized understanding.    Return to clinic: 2 weeks    Length of Service (minutes): 60      Each patient to whom he or she provides medical services by telemedicine is: (1) informed of the relationship between the physician and patient and the respective role of any other health care provider with respect to management of the patient; and (2) notified that he or she may decline to receive medical services by telemedicine and may withdraw from such care at any time.

## 2025-05-06 ENCOUNTER — RESULTS FOLLOW-UP (OUTPATIENT)
Dept: OBSTETRICS AND GYNECOLOGY | Facility: CLINIC | Age: 20
End: 2025-05-06

## 2025-05-06 ENCOUNTER — LAB VISIT (OUTPATIENT)
Dept: LAB | Facility: HOSPITAL | Age: 20
End: 2025-05-06
Attending: OBSTETRICS & GYNECOLOGY
Payer: COMMERCIAL

## 2025-05-06 ENCOUNTER — OFFICE VISIT (OUTPATIENT)
Dept: OBSTETRICS AND GYNECOLOGY | Facility: CLINIC | Age: 20
End: 2025-05-06
Payer: COMMERCIAL

## 2025-05-06 VITALS
BODY MASS INDEX: 18.93 KG/M2 | SYSTOLIC BLOOD PRESSURE: 104 MMHG | WEIGHT: 113.63 LBS | DIASTOLIC BLOOD PRESSURE: 62 MMHG | HEIGHT: 65 IN

## 2025-05-06 DIAGNOSIS — Z30.9 ENCOUNTER FOR CONTRACEPTIVE MANAGEMENT, UNSPECIFIED TYPE: ICD-10-CM

## 2025-05-06 DIAGNOSIS — Z11.3 SCREENING EXAMINATION FOR STI: Primary | ICD-10-CM

## 2025-05-06 DIAGNOSIS — Z11.3 SCREENING EXAMINATION FOR STI: ICD-10-CM

## 2025-05-06 LAB
HBV SURFACE AG SERPL QL IA: NORMAL
HCV AB SERPL QL IA: NORMAL
HIV 1+2 AB+HIV1 P24 AG SERPL QL IA: NORMAL
T PALLIDUM IGG+IGM SER QL: NORMAL

## 2025-05-06 PROCEDURE — 86803 HEPATITIS C AB TEST: CPT

## 2025-05-06 PROCEDURE — 1159F MED LIST DOCD IN RCRD: CPT | Mod: CPTII,S$GLB,, | Performed by: OBSTETRICS & GYNECOLOGY

## 2025-05-06 PROCEDURE — 3074F SYST BP LT 130 MM HG: CPT | Mod: CPTII,S$GLB,, | Performed by: OBSTETRICS & GYNECOLOGY

## 2025-05-06 PROCEDURE — 1160F RVW MEDS BY RX/DR IN RCRD: CPT | Mod: CPTII,S$GLB,, | Performed by: OBSTETRICS & GYNECOLOGY

## 2025-05-06 PROCEDURE — 3008F BODY MASS INDEX DOCD: CPT | Mod: CPTII,S$GLB,, | Performed by: OBSTETRICS & GYNECOLOGY

## 2025-05-06 PROCEDURE — 99213 OFFICE O/P EST LOW 20 MIN: CPT | Mod: S$GLB,,, | Performed by: OBSTETRICS & GYNECOLOGY

## 2025-05-06 PROCEDURE — 86593 SYPHILIS TEST NON-TREP QUANT: CPT

## 2025-05-06 PROCEDURE — 99999 PR PBB SHADOW E&M-EST. PATIENT-LVL III: CPT | Mod: PBBFAC,,, | Performed by: OBSTETRICS & GYNECOLOGY

## 2025-05-06 PROCEDURE — 87340 HEPATITIS B SURFACE AG IA: CPT

## 2025-05-06 PROCEDURE — 87389 HIV-1 AG W/HIV-1&-2 AB AG IA: CPT

## 2025-05-06 PROCEDURE — 3078F DIAST BP <80 MM HG: CPT | Mod: CPTII,S$GLB,, | Performed by: OBSTETRICS & GYNECOLOGY

## 2025-05-06 NOTE — PROGRESS NOTES
Subjective     Patient ID: Tamanna Waters is a 19 y.o. adult.    Chief Complaint:  STI testing      History of Present Illness  HPI  20 y/o presents for STI testing. Patient had Depo Provera injection in March - had mood changes for a month, had one period that was heavy and painful. Patient does not desire to continue the injections and declines contraception.     Reports normal vaginal discharge. Reports two days of vaginal itching but thought maybe it was related to hair and it has now resolved.    GYN & OB History  Patient's last menstrual period was 2025.   Date of Last Pap: No result found    OB History    Para Term  AB Living   0 0 0 0 0 0   SAB IAB Ectopic Multiple Live Births   0 0 0 0 0       Review of Systems  Review of Systems   Constitutional:  Negative for chills, diaphoresis, fatigue and fever.   Respiratory:  Negative for cough and shortness of breath.    Cardiovascular:  Negative for chest pain and palpitations.   Gastrointestinal:  Negative for abdominal pain, constipation, diarrhea, nausea and vomiting.   Genitourinary:  Negative for dyspareunia, pelvic pain, vaginal bleeding, vaginal discharge and vaginal pain.   Neurological:  Negative for headaches.   Psychiatric/Behavioral:  Negative for depression.           Objective   Physical Exam:   Constitutional: Tamanna appears well-developed and well-nourished. No distress.    HENT:   Head: Normocephalic and atraumatic.       Pulmonary/Chest: Effort normal.          Genitourinary:    Genitourinary Comments: Normal external female genitalia; vagina rugated, normal; cervix normal, no masses;                 Neurological: Tamanna is alert.     Psychiatric: Tamanna has a normal mood and affect. Tamanna's behavior is normal. Judgment and thought content normal.            Assessment and Plan     1. Screening examination for STI    2. Encounter for contraceptive management, unspecified type          Plan:  Tamanna was seen today for  sti testing.    Diagnoses and all orders for this visit:    Screening examination for STI  -     C. trachomatis/N. gonorrhoeae by AMP DNA  -     Vaginosis Screen by DNA Probe  -     HIV 1/2 Ag/Ab (4th Gen); Future  -     Treponema Pallidium Antibodies IgG, IgM; Future  -     Hepatitis B Surface Antigen; Future  -     HEPATITIS C ANTIBODY; Future    Encounter for contraceptive management, unspecified type  -     POCT Urine Pregnancy    Discussed alternative contraception options and patient declined contraception. Discussed phexxi.    Orders Placed This Encounter   Procedures    C. trachomatis/N. gonorrhoeae by AMP DNA    Vaginosis Screen by DNA Probe    HIV 1/2 Ag/Ab (4th Gen)    Treponema Pallidium Antibodies IgG, IgM    Hepatitis B Surface Antigen    HEPATITIS C ANTIBODY    POCT Urine Pregnancy       Follow up if symptoms worsen or fail to improve.

## 2025-05-07 ENCOUNTER — TELEPHONE (OUTPATIENT)
Dept: PSYCHIATRY | Facility: CLINIC | Age: 20
End: 2025-05-07
Payer: COMMERCIAL

## 2025-05-07 ENCOUNTER — CLINICAL SUPPORT (OUTPATIENT)
Dept: PSYCHIATRY | Facility: CLINIC | Age: 20
End: 2025-05-07
Payer: COMMERCIAL

## 2025-05-07 DIAGNOSIS — F98.8 ATTENTION DEFICIT DISORDER (ADD) WITHOUT HYPERACTIVITY: ICD-10-CM

## 2025-05-07 DIAGNOSIS — F12.20 CANNABIS USE DISORDER, SEVERE, DEPENDENCE: ICD-10-CM

## 2025-05-07 DIAGNOSIS — F39 MOOD DISORDER: ICD-10-CM

## 2025-05-07 DIAGNOSIS — F41.1 GAD (GENERALIZED ANXIETY DISORDER): Primary | ICD-10-CM

## 2025-05-07 PROCEDURE — 90837 PSYTX W PT 60 MINUTES: CPT | Mod: 95,,, | Performed by: COUNSELOR

## 2025-05-07 NOTE — TELEPHONE ENCOUNTER
Patient called and left a message this morning wanting to see if her appt today could be changed to a virtual. She not feeling that great this morning and would rather stay at home.
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
## 2025-05-07 NOTE — TELEPHONE ENCOUNTER
Left message for patient that appt is changed to a virtual. Patient immediately called back and I advised that the appt was changed. Patient verbalized understanding.

## 2025-06-01 ENCOUNTER — PATIENT MESSAGE (OUTPATIENT)
Dept: PSYCHIATRY | Facility: CLINIC | Age: 20
End: 2025-06-01
Payer: COMMERCIAL

## 2025-06-02 ENCOUNTER — OFFICE VISIT (OUTPATIENT)
Dept: PSYCHIATRY | Facility: CLINIC | Age: 20
End: 2025-06-02
Payer: COMMERCIAL

## 2025-06-02 DIAGNOSIS — F39 MOOD DISORDER: Primary | ICD-10-CM

## 2025-06-02 DIAGNOSIS — F41.1 GAD (GENERALIZED ANXIETY DISORDER): ICD-10-CM

## 2025-06-02 DIAGNOSIS — F12.20 CANNABIS USE DISORDER, SEVERE, DEPENDENCE: ICD-10-CM

## 2025-06-02 RX ORDER — HYDROXYZINE HYDROCHLORIDE 10 MG/1
10 TABLET, FILM COATED ORAL 3 TIMES DAILY PRN
Qty: 90 TABLET | Refills: 0 | Status: SHIPPED | OUTPATIENT
Start: 2025-06-02

## 2025-06-03 ENCOUNTER — CLINICAL SUPPORT (OUTPATIENT)
Dept: PSYCHIATRY | Facility: CLINIC | Age: 20
End: 2025-06-03
Payer: COMMERCIAL

## 2025-06-03 DIAGNOSIS — F39 MOOD DISORDER: Primary | ICD-10-CM

## 2025-06-03 DIAGNOSIS — F17.200 NICOTINE DEPENDENCE DUE TO VAPING NON-TOBACCO PRODUCT: ICD-10-CM

## 2025-06-03 DIAGNOSIS — F33.0 MDD (MAJOR DEPRESSIVE DISORDER), RECURRENT EPISODE, MILD: ICD-10-CM

## 2025-06-03 DIAGNOSIS — F51.02 INSOMNIA DUE TO PSYCHOLOGICAL STRESS: ICD-10-CM

## 2025-06-03 DIAGNOSIS — F12.20 CANNABIS USE DISORDER, SEVERE, DEPENDENCE: ICD-10-CM

## 2025-06-03 DIAGNOSIS — F41.1 GAD (GENERALIZED ANXIETY DISORDER): ICD-10-CM

## 2025-06-03 DIAGNOSIS — F98.8 ATTENTION DEFICIT DISORDER (ADD) WITHOUT HYPERACTIVITY: ICD-10-CM

## 2025-06-09 ENCOUNTER — CLINICAL SUPPORT (OUTPATIENT)
Dept: PSYCHIATRY | Facility: CLINIC | Age: 20
End: 2025-06-09
Payer: COMMERCIAL

## 2025-06-09 DIAGNOSIS — F33.0 MDD (MAJOR DEPRESSIVE DISORDER), RECURRENT EPISODE, MILD: ICD-10-CM

## 2025-06-09 DIAGNOSIS — F41.9 ANXIETY: ICD-10-CM

## 2025-06-09 DIAGNOSIS — F39 MOOD DISORDER: Primary | ICD-10-CM

## 2025-06-09 DIAGNOSIS — F41.1 GAD (GENERALIZED ANXIETY DISORDER): ICD-10-CM

## 2025-06-09 PROCEDURE — 90832 PSYTX W PT 30 MINUTES: CPT | Mod: 95,,, | Performed by: COUNSELOR

## 2025-06-09 NOTE — PROGRESS NOTES
"Individual Psychotherapy (PhD/LCSW)    6/9/2025    Site:  [place]      The patient location [place]/City is:  [place]  The patient phone number is [phone]   Visit type: Virtual visit with synchronous audio and video  Each patient to whom he or she provides medical services by telemedicine is:  (1) informed of the relationship between the physician and patient and the respective role of any other health care provider with respect to management of the patient; and (2) notified that he or she may decline to receive medical services by telemedicine and may withdraw from such care at any time.  Crisis Disclaimer: Patient was informed that due to the virtual nature of the visit, that if a crisis develops, protocols will be implemented to ensure patient safety, including but not limited to: 1) Initiating a welfare check with local Law Enforcement, 2) Calling 1/National Crisis Hotline, and/or 3) Initiating PEC/CEC procedures.       Therapeutic Intervention: Met with patient.  Outpatient - Insight oriented psychotherapy 30 min - CPT code 40079, Outpatient - Behavior modifying psychotherapy 30 min - CPT code 59706, and Outpatient - Supportive psychotherapy 30 min - CPT Code 26214    Chief complaint/reason for encounter: depression, mood swings, and mood elevation             Interval history and content of current session: Patient reported for a virtual session.  She denied SI, HI and self-harm.  Patient reported continued anxiety and depression.  She reported difficulty managing thoughts and emotions. Sleep is reported to be poor because her girlfriend hasn't been around. She reports that she only eats if she is "starving".  Discussed ways to prioritize her mental, emotional and physical health.  Patient believes that medicine is her only solution. She has plans to go to beach with a friend.  Home is strained and stressful per her report. 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 Patient will return as scheduled.     Treatment plan:  Target symptoms: depression, anxiety , mood disorder  Why chosen therapy is appropriate versus another modality: relevant to diagnosis, patient responds to this modality, evidence based practice  Outcome monitoring methods: self-report, observation  Therapeutic intervention type: insight oriented psychotherapy, behavior modifying psychotherapy, supportive psychotherapy    Risk parameters:  Patient reports no suicidal ideation  Patient reports no homicidal ideation  Patient reports no self-injurious behavior  Patient reports no violent behavior    Verbal deficits: None    Patient's response to intervention:  The patient's response to intervention is accepting.    Progress toward goals and other mental status changes:  The patient's progress toward goals is fair .    Diagnosis:     ICD-10-CM ICD-9-CM   1. Mood disorder  F39 296.90   2. LINO (generalized anxiety disorder)  F41.1 300.02   3. MDD (major depressive disorder), recurrent episode, mild  F33.0 296.31   4. Anxiety  F41.9 300.00       Plan:  individual psychotherapy Pt to go to ED or call 911 if symptoms worsen or if Tamanna has thoughts of harming self and/or others. Pt verbalized understanding.    Return to clinic: as scheduled    Length of Service (minutes): 45      Each patient to whom he or she provides medical services by telemedicine is: (1) informed of the relationship between the physician and patient and the respective role of any other health care provider with respect to management of the patient; and (2) notified that he or she may decline to receive medical services by telemedicine and may withdraw from such care at any time.

## 2025-06-19 ENCOUNTER — CLINICAL SUPPORT (OUTPATIENT)
Dept: PSYCHIATRY | Facility: CLINIC | Age: 20
End: 2025-06-19
Payer: COMMERCIAL

## 2025-06-19 DIAGNOSIS — F41.1 GAD (GENERALIZED ANXIETY DISORDER): ICD-10-CM

## 2025-06-19 DIAGNOSIS — F12.20 CANNABIS USE DISORDER, SEVERE, DEPENDENCE: ICD-10-CM

## 2025-06-19 DIAGNOSIS — F33.0 MDD (MAJOR DEPRESSIVE DISORDER), RECURRENT EPISODE, MILD: ICD-10-CM

## 2025-06-19 DIAGNOSIS — F98.8 ATTENTION DEFICIT DISORDER (ADD) WITHOUT HYPERACTIVITY: ICD-10-CM

## 2025-06-19 DIAGNOSIS — F51.02 INSOMNIA DUE TO PSYCHOLOGICAL STRESS: ICD-10-CM

## 2025-06-19 DIAGNOSIS — F17.200 NICOTINE DEPENDENCE DUE TO VAPING NON-TOBACCO PRODUCT: ICD-10-CM

## 2025-06-19 DIAGNOSIS — F39 MOOD DISORDER: Primary | ICD-10-CM

## 2025-06-19 NOTE — PROGRESS NOTES
Individual Psychotherapy (PhD/LCSW)    6/19/2025    Site:  [place]         The patient location [place]/City is:  [place]  The patient phone number is [phone]   Visit type: Virtual visit with synchronous audio and video  Each patient to whom he or she provides medical services by telemedicine is:  (1) informed of the relationship between the physician and patient and the respective role of any other health care provider with respect to management of the patient; and (2) notified that he or she may decline to receive medical services by telemedicine and may withdraw from such care at any time.  Crisis Disclaimer: Patient was informed that due to the virtual nature of the visit, that if a crisis develops, protocols will be implemented to ensure patient safety, including but not limited to: 1) Initiating a welfare check with local Law Enforcement, 2) Calling 911/National Crisis Hotline, and/or 3) Initiating PEC/CEC procedures.    Therapeutic Intervention: Met with patient.  Outpatient - Insight oriented psychotherapy 60 min - CPT code 35972, Outpatient - Behavior modifying psychotherapy 60 min - CPT code 50842, and Outpatient - Supportive psychotherapy 60 min - CPT Code 97543    Chief complaint/reason for encounter: depression, mood swings, mood elevation, anxiety, sleep, and behavior             Interval history and content of current session:  Patient presented for a virtual follow-up session.  Patient reported being upset that her father did not acknowledge his father's day gives from her the way she wanted.  She reported that she wrote him a 2 page letter expressing her thoughts and feelings and  expected him to be more appreciative, but he only said thank you with what she described as minimal emotion.  Patient reported being frustrated that her mother and father seemed to be negative and she says that her needs for medication  were not getting met with her old provider.  Patient is planning a vacation with  her partner/friend and feels this will help her reset her mood and attitude.  Discussed developing realistic expectations were others are concerned. Patient will return as scheduled.   Treatment plan:  Target symptoms: depression, distractability, lack of focus, anxiety , substance abuse, mood swings, mood disorder  Why chosen therapy is appropriate versus another modality: relevant to diagnosis, patient responds to this modality, evidence based practice  Outcome monitoring methods: self-report, observation  Therapeutic intervention type: insight oriented psychotherapy, behavior modifying psychotherapy, supportive psychotherapy    Risk parameters:  Patient reports no suicidal ideation  Patient reports no homicidal ideation  Patient reports no self-injurious behavior  Patient reports no violent behavior    Verbal deficits: None    Patient's response to intervention:  The patient's response to intervention is accepting.    Progress toward goals and other mental status changes:  The patient's progress toward goals is limited.    Diagnosis:     ICD-10-CM ICD-9-CM   1. Mood disorder  F39 296.90   2. LINO (generalized anxiety disorder)  F41.1 300.02   3. MDD (major depressive disorder), recurrent episode, mild  F33.0 296.31   4. Cannabis use disorder, severe, dependence  F12.20 304.30   5. Attention deficit disorder (ADD) without hyperactivity  F98.8 314.00   6. Nicotine dependence due to vaping non-tobacco product  F17.200 305.1   7. Insomnia due to psychological stress  F51.02 307.41       Plan:  individual psychotherapy Pt to go to ED or call 911 if symptoms worsen or if Tamanna has thoughts of harming self and/or others. Pt verbalized understanding.    Return to clinic: as scheduled    Length of Service (minutes): 60      Each patient to whom he or she provides medical services by telemedicine is: (1) informed of the relationship between the physician and patient and the respective role of any other health care  provider with respect to management of the patient; and (2) notified that he or she may decline to receive medical services by telemedicine and may withdraw from such care at any time.

## 2025-06-26 ENCOUNTER — CLINICAL SUPPORT (OUTPATIENT)
Dept: PSYCHIATRY | Facility: CLINIC | Age: 20
End: 2025-06-26
Payer: COMMERCIAL

## 2025-06-26 DIAGNOSIS — F17.200 NICOTINE DEPENDENCE DUE TO VAPING NON-TOBACCO PRODUCT: ICD-10-CM

## 2025-06-26 DIAGNOSIS — F98.8 ATTENTION DEFICIT DISORDER (ADD) WITHOUT HYPERACTIVITY: ICD-10-CM

## 2025-06-26 DIAGNOSIS — F12.20 CANNABIS USE DISORDER, SEVERE, DEPENDENCE: ICD-10-CM

## 2025-06-26 DIAGNOSIS — F51.02 INSOMNIA DUE TO PSYCHOLOGICAL STRESS: ICD-10-CM

## 2025-06-26 DIAGNOSIS — F41.1 GAD (GENERALIZED ANXIETY DISORDER): ICD-10-CM

## 2025-06-26 DIAGNOSIS — F39 MOOD DISORDER: Primary | ICD-10-CM

## 2025-06-26 DIAGNOSIS — F33.0 MDD (MAJOR DEPRESSIVE DISORDER), RECURRENT EPISODE, MILD: ICD-10-CM

## 2025-06-27 NOTE — PROGRESS NOTES
Individual Psychotherapy (PhD/LCSW)    6/26/2025    Site:  [place]         The patient location is: [place]  Visit type: audiovisual  Each patient to whom he or she provides medical services by telemedicine is:  (1) informed of the relationship between the physician and patient and the respective role of any other health care provider with respect to management of the patient; and (2) notified that he or she may decline to receive medical services by telemedicine and may withdraw from such care at any time.  Therapeutic Intervention: Met with patient.  Outpatient - Insight oriented psychotherapy 60 min - CPT code 13262, Outpatient - Behavior modifying psychotherapy 60 min - CPT code 32848, and Outpatient - Supportive psychotherapy 60 min - CPT Code 85422    Chief complaint/reason for encounter:  attention deficit, depression, mood swings, mood elevation, anxiety, and sleep             Interval history and content of current session:  Patient reported for audiovisual session.  Patient denied SI, HI and self-harm.  Patient was in an upbeat mood and explained it by saying she had a great time on her beach get away with her girlfriend.  The only downside was the cameras on the patio that made her feel watched.  Patient reported feeling more grounded and more in control of herself than before.  She still reports that she needs medications but has a new appointment with a new psychiatrist.  Still minor tension in the home with her parents.  Patient is seeking employment and ultimately has plans to live on her own.  Sleep is fair; appetite is fair.  Patient will return as scheduled.    Treatment plan:  Target symptoms: recurrent depression, anxiety , substance abuse, mood disorder  Why chosen therapy is appropriate versus another modality: relevant to diagnosis, patient responds to this modality, evidence based practice  Outcome monitoring methods: self-report, observation  Therapeutic intervention type: insight  oriented psychotherapy, behavior modifying psychotherapy, supportive psychotherapy    Risk parameters:  Patient reports no suicidal ideation  Patient reports no homicidal ideation  Patient reports no self-injurious behavior  Patient reports no violent behavior    Verbal deficits: None    Patient's response to intervention:  The patient's response to intervention is accepting.    Progress toward goals and other mental status changes:  The patient's progress toward goals is fair .    Diagnosis:     ICD-10-CM ICD-9-CM   1. Mood disorder  F39 296.90   2. LINO (generalized anxiety disorder)  F41.1 300.02   3. MDD (major depressive disorder), recurrent episode, mild  F33.0 296.31   4. Cannabis use disorder, severe, dependence  F12.20 304.30   5. Attention deficit disorder (ADD) without hyperactivity  F98.8 314.00   6. Nicotine dependence due to vaping non-tobacco product  F17.200 305.1   7. Insomnia due to psychological stress  F51.02 307.41       Plan:  individual psychotherapy Pt to go to ED or call 911 if symptoms worsen or if Tamanna has thoughts of harming self and/or others. Pt verbalized understanding.    Return to clinic: 2 weeks    Length of Service (minutes): 60      Each patient to whom he or she provides medical services by telemedicine is: (1) informed of the relationship between the physician and patient and the respective role of any other health care provider with respect to management of the patient; and (2) notified that he or she may decline to receive medical services by telemedicine and may withdraw from such care at any time.

## 2025-06-29 ENCOUNTER — PATIENT MESSAGE (OUTPATIENT)
Dept: PSYCHIATRY | Facility: CLINIC | Age: 20
End: 2025-06-29
Payer: COMMERCIAL

## 2025-06-30 ENCOUNTER — OFFICE VISIT (OUTPATIENT)
Dept: PSYCHIATRY | Facility: CLINIC | Age: 20
End: 2025-06-30
Payer: COMMERCIAL

## 2025-06-30 DIAGNOSIS — F12.20 CANNABIS USE DISORDER, SEVERE, DEPENDENCE: ICD-10-CM

## 2025-06-30 DIAGNOSIS — F39 MOOD DISORDER: Primary | ICD-10-CM

## 2025-06-30 DIAGNOSIS — F41.1 GAD (GENERALIZED ANXIETY DISORDER): ICD-10-CM

## 2025-06-30 PROCEDURE — 1159F MED LIST DOCD IN RCRD: CPT | Mod: CPTII,95,, | Performed by: PSYCHOLOGIST

## 2025-06-30 PROCEDURE — 98005 SYNCH AUDIO-VIDEO EST LOW 20: CPT | Mod: 95,,, | Performed by: PSYCHOLOGIST

## 2025-06-30 NOTE — PROGRESS NOTES
"The patient location is: [place]  The chief complaint leading to consultation is: anxiety, mood    Visit type: audiovisual    15 minutes of total time spent on the encounter, which includes face to face time and non-face to face time preparing to see the patient (eg, review of tests), Obtaining and/or reviewing separately obtained history, Documenting clinical information in the electronic or other health record, Independently interpreting results (not separately reported) and communicating results to the patient/family/caregiver, or Care coordination (not separately reported).     Each patient to whom he or she provides medical services by telemedicine is:  (1) informed of the relationship between the physician and patient and the respective role of any other health care provider with respect to management of the patient; and (2) notified that he or she may decline to receive medical services by telemedicine and may withdraw from such care at any time.    Notes:     Outpatient Psychiatry Follow-Up Visit    Clinical Status of Patient: Outpatient (Ambulatory)  06/30/2025     Chief Complaint:  presenting today for a follow-up.       Interval History and Content of Current Session:  Interim Events/Subjective Report/Content of Current Session:  follow-up appointment.    Pt is a 19 y/o female with past psychiatric hx of mood disorder, anxiety, cannabis abuse who presents for follow-up treatment. Patient reports not taking the prescribed hydroxyzine, believing it would not be effective for them. They express confusion about their status with the current healthcare provider, mentioning receipt of a letter indicating they were "kicked off" the service after attending two previous appointments. Patient has found another psychologist/psychiatrist, with an appointment scheduled for August, possibly in [place] or [place].     Past Psychiatric hx: Aripiprazole, mirtazapine, paroxetine, propranolol (constipation), THC Rx, " Adderall, trazodone, atomoxetine    Past Medical hx:   Past Medical History:   Diagnosis Date    Acne 9 years old    Facial trauma Acne?? Very dry skin, dry eyes    Hearing loss My fault    Iron deficiency anemia due to chronic blood loss 11/01/2022    Jaundice     birth    Mental disorder 17    ADD, im being treated for borderline personality disorder, ocd, adhd, pmdd, etc    Seasonal allergies Born    Seizures Around 9, only arpund sunlight flickering        Interim hx:  Medication changes last visit: Start a trial of hydroxyzine 10 mg     Denies suicidal/homicidal ideations.  Denies hopelessness/worthlessness.    Denies auditory/visual hallucinations      Alcohol: Infrequent use  Drug: Pt denies  Tobacco: Pt denies      Review of Systems   PSYCHIATRIC: Pertinent items are noted in the narrative.        CONSTITUTIONAL: weight stable    Past Medical, Family and Social History: The patient's past medical, family and social history have been reviewed and updated as appropriate within the electronic medical record. See encounter notes.     Current Psychiatric Medication:  hydroxyzine 10 mg     Compliance: limited     Side effects: Pt denies     Risk Parameters:  Patient reports no suicidal ideation  Patient reports no homicidal ideation  Patient reports no self-injurious behavior  Patient reports no violent behavior     Exam (detailed: at least 9 elements; comprehensive: all 15 elements)   Constitutional  Vitals:  Most recent vital signs, dated less than 90 days prior to this appointment, were reviewed.        General:  unremarkable, age appropriate, casual attire, good eye contact, good rapport       Musculoskeletal  Muscle Strength/Tone:  no flaccidity, no tremor    Gait & Station:  normal      Psychiatric                       Speech:  normal tone, normal rate, rhythm, and volume   Mood & Affect:   Depressed, anxiety         Thought Process:   Goal directed; Linear    Associations:   intact   Thought Content:    No SI/HI, delusions, or paranoia, no AV/VH   Insight & Judgement:   Good, adequate to circumstances   Orientation:   grossly intact; alert and oriented x 4    Memory:  intact for content of interview    Language:  grossly intact, can repeat    Attention Span  : Grossly intact for content of interview   Fund of Knowledge:   intact and appropriate to age and level of education        Assessment and Diagnosis   Status/Progress/Impression:     Assessment & Plan    IMPRESSION:  - Patient previously prescribed hydroxyzine but did not take it.    PLAN SUMMARY:  - Patient to seek care from another prescriber    Diagnosis:   1) Mood disorder  2) Anxiety Disorder  3) Cannabis use disorder  4) Borderline Personality Disorder  Intervention/Counseling/Treatment Plan   Medication Management:      1. D/C hydroxyzine 10 mg    2. Cont in therapy with Abhay Peña LCSW with [facility] or transfer to a specialist in DBT    3. Call to report any worsening of symptoms or problems with the medication. Pt instructed to go to ER with thoughts of harming self, others    Psychotherapy: none  Target symptoms: anxiety, interpersonal conflict  Why chosen therapy is appropriate versus another modality: CBT used; relevant to diagnosis, patient responds to this modality  Outcome monitoring methods: self-report, observation  Therapeutic intervention type: MI techniques, coping strategies  Topics discussed/themes: building skills sets for symptom management, symptom recognition, nutrition, exercise  The patient's response to the intervention is accepting  Patient's response to treatment is: good.   The patient's progress toward treatment goals: improving     Return to clinic: Pt dismissed from practice      -R/B/SE's of medications discussed with the pt who expresses understanding and chooses to take medications as prescribed.   -Pt instructed to call clinic, 911 or go to nearest emergency room if sxs worsen or pt is in   crisis. The pt expresses  understanding.    Richi Francisco, PhD, MP

## 2025-07-10 ENCOUNTER — CLINICAL SUPPORT (OUTPATIENT)
Dept: PSYCHIATRY | Facility: CLINIC | Age: 20
End: 2025-07-10
Payer: COMMERCIAL

## 2025-07-10 DIAGNOSIS — F41.1 GAD (GENERALIZED ANXIETY DISORDER): ICD-10-CM

## 2025-07-10 DIAGNOSIS — F33.0 MDD (MAJOR DEPRESSIVE DISORDER), RECURRENT EPISODE, MILD: ICD-10-CM

## 2025-07-10 DIAGNOSIS — F17.200 NICOTINE DEPENDENCE DUE TO VAPING NON-TOBACCO PRODUCT: ICD-10-CM

## 2025-07-10 DIAGNOSIS — F12.20 CANNABIS USE DISORDER, SEVERE, DEPENDENCE: ICD-10-CM

## 2025-07-10 DIAGNOSIS — F39 MOOD DISORDER: Primary | ICD-10-CM

## 2025-07-10 DIAGNOSIS — F98.8 ATTENTION DEFICIT DISORDER (ADD) WITHOUT HYPERACTIVITY: ICD-10-CM

## 2025-07-11 NOTE — PROGRESS NOTES
Individual Psychotherapy (PhD/LCSW)    7/10/2025    Site:  [place]       The patient location [place]/City is:  [place]  The patient phone number is [phone]   Visit type: Virtual visit with synchronous audio and video  Each patient to whom he or she provides medical services by telemedicine is:  (1) informed of the relationship between the physician and patient and the respective role of any other health care provider with respect to management of the patient; and (2) notified that he or she may decline to receive medical services by telemedicine and may withdraw from such care at any time.  Crisis Disclaimer: Patient was informed that due to the virtual nature of the visit, that if a crisis develops, protocols will be implemented to ensure patient safety, including but not limited to: 1) Initiating a welfare check with local Law Enforcement, 2) Calling 1/National Crisis Hotline, and/or 3) Initiating PEC/CEC procedures.      Therapeutic Intervention: Met with patient.  Outpatient - Insight oriented psychotherapy 60 min - CPT code 38040, Outpatient - Behavior modifying psychotherapy 60 min - CPT code 16742, and Outpatient - Supportive psychotherapy 60 min - CPT Code 37433    Chief complaint/reason for encounter: attention deficit, anxiety, and behavior             Interval history and content of current session: Patient presented for a virtual session.  She denied SI, HI and self-harm.  Patient continues to seek guidance around building self-esteem , boundaries and career goals.  She is anxious about testing for her  license. Patient ruminates over her strained relationship with parents.  She believes that girlfriend is the only one who gets her and completes her. Patient is getting more comfortable with girlfriend parents and talked about drinking and smoking with them as common ground.  Patient's  sleep is fair and appetite is fair. She doesn't not believe her psychotropics are working the way  they should.  Has pending new psychiatrist appointment to discuss.  She will return as scheduled.      Treatment plan:  Target symptoms: depression, distractability, lack of focus, anxiety , substance abuse  Why chosen therapy is appropriate versus another modality: relevant to diagnosis, patient responds to this modality, evidence based practice  Outcome monitoring methods: self-report, observation  Therapeutic intervention type: insight oriented psychotherapy, behavior modifying psychotherapy, supportive psychotherapy    Risk parameters:  Patient reports no suicidal ideation  Patient reports no homicidal ideation  Patient reports no self-injurious behavior  Patient reports no violent behavior    Verbal deficits: None    Patient's response to intervention:  The patient's response to intervention is accepting.    Progress toward goals and other mental status changes:  The patient's progress toward goals is fair .    Diagnosis:     ICD-10-CM ICD-9-CM   1. Mood disorder  F39 296.90   2. LINO (generalized anxiety disorder)  F41.1 300.02   3. Cannabis use disorder, severe, dependence  F12.20 304.30   4. MDD (major depressive disorder), recurrent episode, mild  F33.0 296.31   5. Attention deficit disorder (ADD) without hyperactivity  F98.8 314.00   6. Nicotine dependence due to vaping non-tobacco product  F17.200 305.1       Plan:  individual psychotherapy Pt to go to ED or call 911 if symptoms worsen or if Tamanna has thoughts of harming self and/or others. Pt verbalized understanding.    Return to clinic: 2 weeks    Length of Service (minutes): 60      Each patient to whom he or she provides medical services by telemedicine is: (1) informed of the relationship between the physician and patient and the respective role of any other health care provider with respect to management of the patient; and (2) notified that he or she may decline to receive medical services by telemedicine and may withdraw from such care at any  time.  Visit type: audiovisual

## 2025-07-14 ENCOUNTER — OFFICE VISIT (OUTPATIENT)
Dept: URGENT CARE | Facility: CLINIC | Age: 20
End: 2025-07-14
Payer: COMMERCIAL

## 2025-07-14 VITALS
WEIGHT: 113 LBS | OXYGEN SATURATION: 100 % | TEMPERATURE: 98 F | HEART RATE: 95 BPM | DIASTOLIC BLOOD PRESSURE: 73 MMHG | SYSTOLIC BLOOD PRESSURE: 105 MMHG | BODY MASS INDEX: 18.83 KG/M2 | HEIGHT: 65 IN | RESPIRATION RATE: 20 BRPM

## 2025-07-14 DIAGNOSIS — M54.50 MIDLINE LOW BACK PAIN WITHOUT SCIATICA, UNSPECIFIED CHRONICITY: Primary | ICD-10-CM
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
## 2025-07-14 DIAGNOSIS — T07.XXXA MULTIPLE ABRASIONS: ICD-10-CM

## 2025-07-14 PROCEDURE — 99213 OFFICE O/P EST LOW 20 MIN: CPT | Mod: S$GLB,,, | Performed by: PHYSICIAN ASSISTANT

## 2025-07-14 PROCEDURE — 72100 X-RAY EXAM L-S SPINE 2/3 VWS: CPT | Mod: S$GLB,,, | Performed by: RADIOLOGY

## 2025-07-14 RX ORDER — MELOXICAM 15 MG/1
15 TABLET ORAL DAILY
Qty: 7 TABLET | Refills: 0 | Status: SHIPPED | OUTPATIENT
Start: 2025-07-14 | End: 2025-07-21

## 2025-07-14 RX ORDER — MUPIROCIN 20 MG/G
OINTMENT TOPICAL 2 TIMES DAILY
Qty: 1 G | Refills: 0 | Status: SHIPPED | OUTPATIENT
Start: 2025-07-14 | End: 2025-07-21

## 2025-07-14 RX ORDER — MUPIROCIN 20 MG/G
OINTMENT TOPICAL
Status: COMPLETED | OUTPATIENT
Start: 2025-07-14 | End: 2025-07-14

## 2025-07-14 RX ADMIN — MUPIROCIN: 20 OINTMENT TOPICAL at 02:07

## 2025-07-14 NOTE — PROGRESS NOTES
"Subjective:      Patient ID: Tamanna Waters is a 19 y.o. adult.    Vitals:  height is 5' 5" (1.651 m) and weight is 51.3 kg (113 lb). Tamanna's oral temperature is 97.8 °F (36.6 °C). Tamanna's blood pressure is 105/73 and Tamanna's pulse is 95. Tamanna's respiration is 20 and oxygen saturation is 100%.     Chief Complaint: Back Pain    Pt presents with c/o back pain after falling down 14 steps.  She fell 2 days ago as well but did not fall as far.  No loss of consciousness head trauma.  Onset- Today. Pt denies taking any medication for pain, did apply ice to back. Pain score 7/10.  Patient has not taken anything today.  Pt states fell down stairs. Last tetanus 03/2021.  Patient states that she has been dealing with chronic back pain for months had x-rays back in March that showed some degenerative changes in the thoracic spine and degenerative arthropathy of the lumbar spine.  She was prescribed ibuprofen 600 mg which has not helped.  She is requesting a referral to be evaluated by specialist.    Back Pain  This is a new problem. The current episode started today. The problem occurs constantly. The problem is unchanged. The quality of the pain is described as aching and shooting. The pain does not radiate. The pain is at a severity of 7/10. The pain is moderate. The pain is The same all the time. The symptoms are aggravated by lying down. Stiffness is present All day. Pertinent negatives include no abdominal pain, bladder incontinence, bowel incontinence, chest pain, fever, headaches, leg pain, numbness, paresis, paresthesias, perianal numbness, tingling, weakness or weight loss. Tamanna has tried ice for the symptoms. The treatment provided no relief.       Constitution: Negative for activity change and fever.   HENT:  Negative for facial trauma.    Neck: Negative for neck pain.   Cardiovascular:  Negative for chest pain.   Gastrointestinal:  Negative for abdominal pain and bowel incontinence.   Genitourinary:  " Negative for bladder incontinence.   Musculoskeletal:  Positive for pain, trauma, back pain and muscle ache. Negative for joint pain, joint swelling, abnormal ROM of joint and muscle cramps.   Skin:  Positive for abrasion. Negative for rash, erythema and bruising.   Neurological:  Negative for headaches, numbness, tingling and tremors.      Past Medical History:   Diagnosis Date    Acne 9 years old    Facial trauma Acne?? Very dry skin, dry eyes    Hearing loss My fault    Iron deficiency anemia due to chronic blood loss 11/01/2022    Jaundice     birth    Mental disorder 17    ADD, im being treated for borderline personality disorder, ocd, adhd, pmdd, etc    Seasonal allergies Born    Seizures Around 9, only arpund sunlight flickering       History reviewed. No pertinent surgical history.    Family History   Problem Relation Name Age of Onset    Diabetes Paternal Grandfather Mandeep espana sr.     No Known Problems Paternal Grandmother      Depression Maternal Grandmother Prisca chewdelaney     Diabetes Maternal Grandmother Prisca liz     Heart disease Maternal Grandmother Prisca liz     Mental illness Maternal Grandmother Prisca liz     Other Maternal Grandmother Prisca liz         miscarriage and stroke    Glaucoma Maternal Grandmother Prisca liz     Diabetes Maternal Grandfather Drake liz     Heart disease Maternal Grandfather Drake cooperallyson     No Known Problems Father      No Known Problems Mother      No Known Problems Brother      No Known Problems Sister      Depression Maternal Aunt      Diabetes Maternal Uncle      No Known Problems Paternal Aunt      No Known Problems Paternal Uncle      Amblyopia Neg Hx      Blindness Neg Hx      Cataracts Neg Hx      Retinal detachment Neg Hx      Strabismus Neg Hx      Breast cancer Neg Hx      Ovarian cancer Neg Hx         Social History     Socioeconomic History    Marital status: Single   Tobacco Use    Smoking status: Every Day     Types:  Cigarettes, Vaping with nicotine    Smokeless tobacco: Current   Substance and Sexual Activity    Alcohol use: Not Currently     Comment: occasional    Drug use: Never    Sexual activity: Yes     Partners: Female     Birth control/protection: None   Social History Narrative    Lives at home with both parents. Attends [employer], no smokers, one outside dog.     Social Drivers of Health     Financial Resource Strain: High Risk (3/18/2025)    Overall Financial Resource Strain (CARDIA)     Difficulty of Paying Living Expenses: Hard   Food Insecurity: Unknown (3/18/2025)    Hunger Vital Sign     Worried About Running Out of Food in the Last Year: Patient declined     Ran Out of Food in the Last Year: Never true   Transportation Needs: No Transportation Needs (3/18/2025)    PRAPARE - Transportation     Lack of Transportation (Medical): No     Lack of Transportation (Non-Medical): No   Physical Activity: Sufficiently Active (3/18/2025)    Exercise Vital Sign     Days of Exercise per Week: 6 days     Minutes of Exercise per Session: 30 min   Stress: Stress Concern Present (3/18/2025)    Citizen of [place] [employer] - Occupational Stress Questionnaire     Feeling of Stress : Rather much   Housing Stability: High Risk (3/18/2025)    Housing Stability Vital Sign     Unable to Pay for Housing in the Last Year: No     Number of Times Moved in the Last Year: 0     Homeless in the Last Year: Yes       Current Medications[1]    Review of patient's allergies indicates:   Allergen Reactions    Doxycycline      Nausea and stomach pain     Phenergan [promethazine]      Dizziness    Wellbutrin [bupropion hcl]      Hx possible seizure activity    Zoloft [sertraline]      Nausea       Objective:     Physical Exam   Constitutional:  Non-toxic appearance. Tamanna does not appear ill. No distress.   Pulmonary/Chest: Effort normal. No respiratory distress. Tamanna exhibits no tenderness.   Abdominal: Normal appearance.    Musculoskeletal:         General: Swelling, tenderness and signs of injury present.      Right hip: Normal.      Left hip: Normal.      Right knee: Normal.      Left knee: Normal.      Right ankle: Normal.      Left ankle: Normal.        Back:       Right upper leg: Normal.      Left upper leg: Normal.      Right lower leg: Normal.      Left lower leg: Normal.      Right foot: Normal.      Left foot: Normal.   Neurological: no focal deficit. Tamanna is alert. No sensory deficit.   Skin: Skin is warm, dry, not diaphoretic, not pale and no rash. Capillary refill takes less than 2 seconds. No bruising and No erythema   Psychiatric: Tamanna's behavior is normal. Mood normal.   Nursing note and vitals reviewed.      Assessment:     1. Midline low back pain without sciatica, unspecified chronicity    2. Fall, initial encounter    3. Multiple abrasions        Plan:       Midline low back pain without sciatica, unspecified chronicity  -     XR LUMBAR SPINE 2 OR 3 VIEWS  -     Ambulatory referral/consult to Orthopedics  -     meloxicam (MOBIC) 15 MG tablet; Take 1 tablet (15 mg total) by mouth once daily. for 7 days  Dispense: 7 tablet; Refill: 0    Fall, initial encounter  -     XR LUMBAR SPINE 2 OR 3 VIEWS    Multiple abrasions  -     mupirocin (BACTROBAN) 2 % ointment; Apply topically 2 (two) times daily. for 7 days  Dispense: 1 g; Refill: 0  -     mupirocin 2 % ointment        I have reviewed the patient chart and pertinent past imaging/labs.  Pt declines any chance of pregnancy due to same sex relationship   Results reviewed   Patient declined Toradol injection, preliminary x-ray results by me reveal no obvious fractures I will notify of official results available.  The wound was cleaned with Hibiclens and saline, topical Bactroban and a bandage    Patient Instructions   A referral was placed for you someone should contact you however if you do not hear from them in a week please call [phone] to schedule  appointment.  Wash your wounds with warm soap and water such as antibacterial dial soap do not use topical alcohol, hydrogen peroxide Neosporin.  Use topical antibiotic that was prescribed twice a day make sure to wipe off old antibiotic before applying new.  Your tetanus is up-to-date as we discussed.  I will notify of official x-ray results once available.  Take Mobic as prescribed with food do not take with Advil or ibuprofen Tylenol is safe.  Ice for 15 minutes at a time 4 to 5 times a day.  Follow up with urgent care as needed             [1]   No current outpatient medications on file.     No current facility-administered medications for this visit.

## 2025-07-14 NOTE — PATIENT INSTRUCTIONS
A referral was placed for you someone should contact you however if you do not hear from them in a week please call [phone] to schedule appointment.  Wash your wounds with warm soap and water such as antibacterial dial soap do not use topical alcohol, hydrogen peroxide Neosporin.  Use topical antibiotic that was prescribed twice a day make sure to wipe off old antibiotic before applying new.  Your tetanus is up-to-date as we discussed.  I will notify of official x-ray results once available.  Take Mobic as prescribed with food do not take with Advil or ibuprofen Tylenol is safe.  Ice for 15 minutes at a time 4 to 5 times a day.  Follow up with urgent care as needed

## 2025-07-15 ENCOUNTER — TELEPHONE (OUTPATIENT)
Dept: NEUROSURGERY | Facility: CLINIC | Age: 20
End: 2025-07-15
Payer: COMMERCIAL

## 2025-07-19 ENCOUNTER — OCHSNER VIRTUAL EMERGENCY DEPARTMENT (OUTPATIENT)
Facility: CLINIC | Age: 20
End: 2025-07-19
Payer: COMMERCIAL

## 2025-07-19 ENCOUNTER — NURSE TRIAGE (OUTPATIENT)
Dept: ADMINISTRATIVE | Facility: CLINIC | Age: 20
End: 2025-07-19
Payer: COMMERCIAL

## 2025-07-19 ENCOUNTER — PATIENT OUTREACH (OUTPATIENT)
Facility: OTHER | Age: 20
End: 2025-07-19
Payer: COMMERCIAL

## 2025-07-19 DIAGNOSIS — M54.41 RIGHT-SIDED LOW BACK PAIN WITH RIGHT-SIDED SCIATICA, UNSPECIFIED CHRONICITY: Primary | ICD-10-CM

## 2025-07-19 NOTE — PROGRESS NOTES
"Patient contacted on call nurse, reports "Pt with fall down stairs Monday. States since Tues having intermittent numbness to right leg. Pain is constant 5-6/10 but worse in certain positions. Today could not weight bear on right leg. " Consulted Dr. Cardona, scheduled with Ortho 7/21/25, sabina follow up 7/22/25  "

## 2025-07-19 NOTE — PLAN OF CARE-OVED
[facility] Plan of Care Note  Referral Source: Nurse On-Call                               Chief Complaint   Patient presents with    Back Pain     20 yo NB patient calls nursing hotline with back pain after fall down stairs Monday. Seen at  and had negative Xray at that time. Was supposed to see provider neurosurgery yesterday for follow-up but there was a mix up with follow up- some how appt was made with neurosurgery but they cancelled her because referral was for ortho. States since Tues having intermittent numbness to right leg with shooting pain that radiates down right leg. Pain is constant 5-6/10 but worse in certain positions. Today pain with bearing weight to right leg 2/2 shooting pain from back. Concerned that has not received a call to schedule ortho yet. Did not take mobic or other pain medication today. Denies weakness, saddle numbness, bowel or bladder incontinence.       Recommendation: Treat in place                       Recommendation comment: if no improvement then ED for advanced imaging    Xray did not show any fractures, however if new numbness to her leg after falling down stairs/ worsening pain she may need CT/MRI. I would recommend taking the mobic, warm compresses now, if that does not improve her pain then she should go to the ED today for neuro evaluation and see if she needs more advanced imaging.     Future Appointments   Date Time Provider Department Center   7/21/2025 10:30 AM Alexander Taveras MD [facility] AN[facility] MAC[facility]O at [facility]       Encounter Diagnosis   Name Primary?    Right-sided low back pain with right-sided sciatica, unspecified chronicity Yes

## 2025-07-19 NOTE — TELEPHONE ENCOUNTER
States appt with back specialist on Thurs 17 but appt was cancelled.  Per chart pt had appt with neurosurgery but appt was canceled because referral was for ortho, not neurosurgery. Seen in  after falling down stairs  on Monday 7/14. Back pain 5-6/10 today- worse when tries to put weight on right leg. States pain shoots down right leg when she tries to put full weight on it. States intermittent numbness to right leg since Tues ? Positional. Did not have the numbness when she was seen in  immediately after fall.  Adds today she is unable to bear weight on right leg and has to limp. States got some relief initially from pain medication but stopped taking because she did not think it was working for her. Has not taken Mobic today. States she is concerned that the follow up appt she had was cancelled and no one has called her to reschedule. Care advice per protocol. Advised by Dr. Cardona to resume Mobic and try warm compresses. If no improvement in pain/ intermittent numbness, go to ED for eval prior to follow up appt. Ortho follow up appt scheduled by Rob scheduling for Monday. Pt has no additional concerns or questions at this time. Advised to monitor and to call back with concerns or worsening symptoms. Verbalized understanding.     Reason for Disposition   Weakness of a leg or foot (e.g., unable to bear weight, dragging foot)    Additional Information   Negative: Passed out (e.g., fainted, lost consciousness, blacked out and was not responding)   Negative: Shock suspected (e.g., cold/pale/clammy skin, too weak to stand, low BP, rapid pulse)   Negative: Sounds like a life-threatening emergency to the triager   Negative: [1] SEVERE back pain (e.g., excruciating) AND [2] sudden onset AND [3] age > 60 years   Major injury to the back (e.g., MVA, fall > 10 feet or 3 meters, penetrating injury, etc.)   Negative: Dangerous mechanism of injury (e.g., MVA, contact sports, trampoline, diving, fall > 10 feet or 3 meters)   (Exception: Back pain began > 1 hour after injury.)   Negative: [1] Weakness (i.e., paralysis, loss of muscle strength) of the leg(s) or foot AND [2] sudden onset after back injury   Negative: [1] Numbness (i.e., loss of sensation) of the leg(s) or foot AND [2] sudden onset after back injury   Negative: [1] Major bleeding (e.g., actively dripping or spurting) AND [2] can't be stopped   Negative: Bullet wound, knife wound, or other penetrating object   Negative: Shock suspected (e.g., cold/pale/clammy skin, too weak to stand, low BP, rapid pulse)   Negative: Sounds like a life-threatening emergency to the triager   Negative: [1] SEVERE PAIN in kidney area (flank) AND [2] follows direct blow to that site   Negative: Blood in urine (red, pink, or tea-colored)   Negative: [1] Unable to urinate (or only a few drops) > 4 hours AND [2] bladder feels very full (e.g., palpable bladder or strong urge to urinate)   Negative: [1] Loss of bladder or bowel control (urine or bowel incontinence; wetting self, leaking stool) AND [2] new-onset   Negative: Numbness (loss of sensation) in groin or rectal area   Negative: Skin is split open or gaping (or length > 1/2 inch or 12 mm)   Negative: Puncture wound of back   Negative: [1] Bleeding AND [2] won't stop after 10 minutes of direct pressure (using correct technique)   Negative: Sounds like a serious injury to the triager    Protocols used: Back Pain-A-AH, Back Injury-A-AH

## 2025-07-21 ENCOUNTER — OFFICE VISIT (OUTPATIENT)
Dept: ORTHOPEDICS | Facility: CLINIC | Age: 20
End: 2025-07-21
Payer: COMMERCIAL

## 2025-07-21 VITALS — HEIGHT: 65 IN | WEIGHT: 115 LBS | BODY MASS INDEX: 19.16 KG/M2

## 2025-07-21 DIAGNOSIS — M54.16 LUMBAR RADICULOPATHY: Primary | ICD-10-CM

## 2025-07-21 PROCEDURE — 1160F RVW MEDS BY RX/DR IN RCRD: CPT | Mod: CPTII,S$GLB,, | Performed by: ORTHOPAEDIC SURGERY

## 2025-07-21 PROCEDURE — 99203 OFFICE O/P NEW LOW 30 MIN: CPT | Mod: S$GLB,,, | Performed by: ORTHOPAEDIC SURGERY

## 2025-07-21 PROCEDURE — 99999 PR PBB SHADOW E&M-EST. PATIENT-LVL III: CPT | Mod: PBBFAC,,, | Performed by: ORTHOPAEDIC SURGERY

## 2025-07-21 PROCEDURE — 3008F BODY MASS INDEX DOCD: CPT | Mod: CPTII,S$GLB,, | Performed by: ORTHOPAEDIC SURGERY

## 2025-07-21 PROCEDURE — 1159F MED LIST DOCD IN RCRD: CPT | Mod: CPTII,S$GLB,, | Performed by: ORTHOPAEDIC SURGERY

## 2025-07-21 RX ORDER — IBUPROFEN 600 MG/1
600 TABLET, FILM COATED ORAL 3 TIMES DAILY
COMMUNITY

## 2025-07-21 NOTE — PROGRESS NOTES
Subjective:       Patient ID: Tamanna Waters is a 19 y.o. adult.    Chief Complaint: Numbness of the Lumbar Spine (Lumbar pain x many years, increased after fall 7.17.25. Had a fall down a flight of stairs.Having pain in lateral hips when rising out of bed and pushing self out of bed. Endorses B/L groin numbness and anterior lower leg pins and needles. Limited standing, walking, bending. Weakness in arms and legs. Endorses bowel and bladder problems. Pain is better with activity and wakes from sleep. Pain waxes and wanes. Has had Toradol shot with some relief. Would like rx for ibuprofen 800mg.)      History of Present Illness    Prior to meeting with the patient I reviewed the medical chart in [facility]. This included reviewing the previous progress notes from our office, review of the patient's last appointment with their primary care provider, review of any visits to the emergency room, and review of any pain management appointments or procedures.   19-year-old female with history of chronic back pain that suddenly worsened the week ago when she fell she complains of pain in the back that radiates down both legs no bowel or bladder incontinence the symptoms in her legs do not usually go past her knee she is aware of a history of scoliosis.    Current Medications  Current Medications[1]    Allergies  Review of patient's allergies indicates:   Allergen Reactions    Doxycycline      Nausea and stomach pain     Phenergan [promethazine]      Dizziness    Wellbutrin [bupropion hcl]      Hx possible seizure activity    Zoloft [sertraline]      Nausea       Past Medical History  Past Medical History:   Diagnosis Date    Acne 9 years old    Facial trauma Acne?? Very dry skin, dry eyes    Hearing loss My fault    Iron deficiency anemia due to chronic blood loss 11/01/2022    Jaundice     birth    Mental disorder 17    ADD, im being treated for borderline personality disorder, ocd, adhd, pmdd, etc    Seasonal allergies  Born    Seizures Around 9, only arpund sunlight flickering       Surgical History  History reviewed. No pertinent surgical history.    Family History:   Family History   Problem Relation Name Age of Onset    Diabetes Paternal Grandfather Mandeep espana sr.     No Known Problems Paternal Grandmother      Depression Maternal Grandmother Prisca hill     Diabetes Maternal Grandmother Prisca hill     Heart disease Maternal Grandmother Prisca hill     Mental illness Maternal Grandmother Prisca hill     Other Maternal Grandmother Prisca hill         miscarriage and stroke    Glaucoma Maternal Grandmother Prisca hill     Diabetes Maternal Grandfather Drake hill     Heart disease Maternal Grandfather Drake hill     No Known Problems Father      No Known Problems Mother      No Known Problems Brother      No Known Problems Sister      Depression Maternal Aunt      Diabetes Maternal Uncle      No Known Problems Paternal Aunt      No Known Problems Paternal Uncle      Amblyopia Neg Hx      Blindness Neg Hx      Cataracts Neg Hx      Retinal detachment Neg Hx      Strabismus Neg Hx      Breast cancer Neg Hx      Ovarian cancer Neg Hx         Social History:   Social History[2]    Hospitalization/Major Diagnostic Procedure:     Review of Systems     General/Constitutional:  Chills denies. Fatigue denies. Fever denies. Weight gain denies. Weight loss denies.    Respiratory:  Shortness of breath denies.    Cardiovascular:  Chest pain denies.    Gastrointestinal:  Constipation denies. Diarrhea denies. Nausea denies. Vomiting denies.     Hematology:  Easy bruising denies. Prolonged bleeding denies.     Genitourinary:  Frequent urination denies. Pain in lower back denies. Painful urination denies.     Musculoskeletal:  See HPI for details    Skin:  Rash denies.    Neurologic:  Dizziness denies. Gait abnormalities denies. Seizures denies. Tingling/Numbess denies.    Psychiatric:  Anxiety denies. Depressed mood  denies.     Objective:   Vital Signs: There were no vitals filed for this visit.     Physical Exam      General Examination:     Constitutional: The patient is alert and oriented to lace person and time. Mood is pleasant.     Head/Face: Normal facial features normal eyebrows    Eyes: Normal extraocular motion bilaterally    Lungs: Respirations are equal and unlabored    Gait is coordinated.    Cardiovascular: There are no swelling or varicosities present.    Lymphatic: Negative for adenopathy    Skin: Normal    Neurological: Level of consciousness normal. Oriented to place person and time and situation    Psychiatric: Oriented to time place person and situation    Patient stand erect has pain when doing so bilateral paraspinous muscle spasm lower lumbar spine range of motion moderate restricted straight leg raising maneuvers are negative except for back pain motor exam grossly normal all major muscle groups both lower extremities    XRAY Report/ Interpretation :  Prior x-rays of lumbar spine were reviewed she has had 2 recent sets of x-rays 1 showing a mild left scoliosis otherwise disc spaces are well-maintained      Assessment:       1. Lumbar radiculopathy        Plan:       Tamanna was seen today for numbness.    Diagnoses and all orders for this visit:    Lumbar radiculopathy  -     MRI Lumbar Spine Without Contrast; Future         Follow up for 2 week f/u - lumbar MRI results .    Due to chronic back pain and exacerbation treatment options were discussed I advised the lumbar MRI at this time.  Return after MRI.    Treatment options were discussed with regards to the nature of the medical condition. Conservative pain intervention and surgical options were discussed in detail. The probability of success of each separate treatment option was discussed. The patient expressed a clear understanding of the treatment options. With regards to surgery, the procedure risk, benefits, complications, and outcomes were  discussed. No guarantees were given with regards to surgical outcome.   The risk of complications, morbidity, and mortality of patient management decisions have been made at the time of this visit. These are associated with the patient's problems, diagnostic procedures and treatment options. This includes the possible management options selected and those considered but not selected by the patient after shared medical decision making we discussed with the patient.     This note was created using Dragon voice recognition software that occasionally misinterpreted phrases or words.           [1]   Current Outpatient Medications   Medication Sig Dispense Refill    ibuprofen (ADVIL,MOTRIN) 600 MG tablet Take 600 mg by mouth 3 (three) times daily.      meloxicam (MOBIC) 15 MG tablet Take 1 tablet (15 mg total) by mouth once daily. for 7 days 7 tablet 0    mupirocin (BACTROBAN) 2 % ointment Apply topically 2 (two) times daily. for 7 days 1 g 0     No current facility-administered medications for this visit.   [2]   Social History  Socioeconomic History    Marital status: Single   Tobacco Use    Smoking status: Every Day     Types: Vaping with nicotine    Smokeless tobacco: Current   Substance and Sexual Activity    Alcohol use: Not Currently     Comment: occasional    Drug use: Never    Sexual activity: Yes     Partners: Female     Birth control/protection: None   Social History Narrative    Lives at home with both parents. Attends [employer], no smokers, one outside dog.     Social Drivers of Health     Financial Resource Strain: High Risk (3/18/2025)    Overall Financial Resource Strain (CARDIA)     Difficulty of Paying Living Expenses: Hard   Food Insecurity: Unknown (3/18/2025)    Hunger Vital Sign     Worried About Running Out of Food in the Last Year: Patient declined     Ran Out of Food in the Last Year: Never true   Transportation Needs: No Transportation Needs (3/18/2025)    PRAPARE - Transportation      Lack of Transportation (Medical): No     Lack of Transportation (Non-Medical): No   Physical Activity: Sufficiently Active (3/18/2025)    Exercise Vital Sign     Days of Exercise per Week: 6 days     Minutes of Exercise per Session: 30 min   Stress: Stress Concern Present (3/18/2025)    [employer] - Occupational Stress Questionnaire     Feeling of Stress : Rather much   Housing Stability: High Risk (3/18/2025)    Housing Stability Vital Sign     Unable to Pay for Housing in the Last Year: No     Number of Times Moved in the Last Year: 0     Homeless in the Last Year: Yes

## 2025-07-22 ENCOUNTER — PATIENT OUTREACH (OUTPATIENT)
Facility: OTHER | Age: 20
End: 2025-07-22
Payer: COMMERCIAL

## 2025-07-23 NOTE — PROGRESS NOTES
Patient seen orthopedic provider, Alexander Taveras MD, on 7/21/2025 per Rob provider, Dr. Cardona, frantz.  Spoke with patient to assess for any additional concerns/needs.  Patient reported no new needs at this time.  Patient reported if further assistance is needed, she will follow up after scheduled MRI on 7/26/2025.  No further needs or concerns noted.

## 2025-07-31 ENCOUNTER — TELEPHONE (OUTPATIENT)
Dept: ORTHOPEDICS | Facility: CLINIC | Age: 20
End: 2025-07-31
Payer: COMMERCIAL

## 2025-07-31 NOTE — TELEPHONE ENCOUNTER
Called phone # on back of patient's insurance card to get auth for MRI.   Lumbar MRI auth approved.  Auth # [id number]  Dates: 07/31/25-01/31/26

## 2025-08-02 ENCOUNTER — HOSPITAL ENCOUNTER (OUTPATIENT)
Dept: RADIOLOGY | Facility: HOSPITAL | Age: 20
Discharge: HOME | End: 2025-08-02
Attending: ORTHOPAEDIC SURGERY
Payer: COMMERCIAL

## 2025-08-02 DIAGNOSIS — M54.16 LUMBAR RADICULOPATHY: ICD-10-CM

## 2025-08-02 PROCEDURE — 72148 MRI LUMBAR SPINE W/O DYE: CPT | Mod: TC

## 2025-08-02 PROCEDURE — 72148 MRI LUMBAR SPINE W/O DYE: CPT | Mod: 26,,, | Performed by: RADIOLOGY

## 2025-08-04 ENCOUNTER — OFFICE VISIT (OUTPATIENT)
Dept: ORTHOPEDICS | Facility: CLINIC | Age: 20
End: 2025-08-04
Payer: COMMERCIAL

## 2025-08-04 VITALS — WEIGHT: 115.06 LBS | HEIGHT: 65 IN | BODY MASS INDEX: 19.17 KG/M2

## 2025-08-04 DIAGNOSIS — M54.16 LUMBAR RADICULOPATHY: Primary | ICD-10-CM

## 2025-08-04 DIAGNOSIS — M25.48 EFFUSION OF LUMBAR FACET JOINT: ICD-10-CM

## 2025-08-04 PROCEDURE — 99213 OFFICE O/P EST LOW 20 MIN: CPT | Mod: S$GLB,,, | Performed by: ORTHOPAEDIC SURGERY

## 2025-08-04 PROCEDURE — 1159F MED LIST DOCD IN RCRD: CPT | Mod: CPTII,S$GLB,, | Performed by: ORTHOPAEDIC SURGERY

## 2025-08-04 PROCEDURE — 99999 PR PBB SHADOW E&M-EST. PATIENT-LVL III: CPT | Mod: PBBFAC,,, | Performed by: ORTHOPAEDIC SURGERY

## 2025-08-04 PROCEDURE — 1160F RVW MEDS BY RX/DR IN RCRD: CPT | Mod: CPTII,S$GLB,, | Performed by: ORTHOPAEDIC SURGERY

## 2025-08-04 PROCEDURE — 3008F BODY MASS INDEX DOCD: CPT | Mod: CPTII,S$GLB,, | Performed by: ORTHOPAEDIC SURGERY

## 2025-08-04 RX ORDER — MELOXICAM 15 MG/1
15 TABLET ORAL DAILY
Qty: 30 TABLET | Refills: 3 | Status: SHIPPED | OUTPATIENT
Start: 2025-08-04

## 2025-08-04 RX ORDER — TIZANIDINE 4 MG/1
4 TABLET ORAL NIGHTLY
Qty: 30 TABLET | Refills: 2 | Status: SHIPPED | OUTPATIENT
Start: 2025-08-04

## 2025-08-15 ENCOUNTER — PATIENT MESSAGE (OUTPATIENT)
Dept: OBSTETRICS AND GYNECOLOGY | Facility: CLINIC | Age: 20
End: 2025-08-15
Payer: COMMERCIAL

## 2025-08-25 ENCOUNTER — CLINICAL SUPPORT (OUTPATIENT)
Dept: REHABILITATION | Facility: HOSPITAL | Age: 20
End: 2025-08-25
Payer: COMMERCIAL

## 2025-08-25 DIAGNOSIS — M62.81 MUSCULAR WEAKNESS: ICD-10-CM

## 2025-08-25 DIAGNOSIS — R26.89 DECREASED SPINAL MOBILITY: Primary | ICD-10-CM

## 2025-08-25 PROCEDURE — 97140 MANUAL THERAPY 1/> REGIONS: CPT | Mod: PO | Performed by: PHYSICAL THERAPIST

## 2025-08-25 PROCEDURE — 97161 PT EVAL LOW COMPLEX 20 MIN: CPT | Mod: PO | Performed by: PHYSICAL THERAPIST
